# Patient Record
Sex: MALE | Race: WHITE
[De-identification: names, ages, dates, MRNs, and addresses within clinical notes are randomized per-mention and may not be internally consistent; named-entity substitution may affect disease eponyms.]

---

## 2022-03-25 PROBLEM — Z00.00 ENCOUNTER FOR PREVENTIVE HEALTH EXAMINATION: Status: ACTIVE | Noted: 2022-03-25

## 2022-04-21 ENCOUNTER — APPOINTMENT (OUTPATIENT)
Dept: BARIATRICS/WEIGHT MGMT | Facility: CLINIC | Age: 67
End: 2022-04-21

## 2023-01-25 ENCOUNTER — APPOINTMENT (OUTPATIENT)
Dept: UROLOGY | Facility: CLINIC | Age: 68
End: 2023-01-25
Payer: MEDICARE

## 2023-01-25 VITALS — DIASTOLIC BLOOD PRESSURE: 82 MMHG | SYSTOLIC BLOOD PRESSURE: 137 MMHG | HEART RATE: 121 BPM

## 2023-01-25 DIAGNOSIS — Z82.49 FAMILY HISTORY OF ISCHEMIC HEART DISEASE AND OTHER DISEASES OF THE CIRCULATORY SYSTEM: ICD-10-CM

## 2023-01-25 PROCEDURE — 99203 OFFICE O/P NEW LOW 30 MIN: CPT

## 2023-01-25 NOTE — ASSESSMENT
[FreeTextEntry1] : Patient is a 67 year male who presents with penile lesion.  Lesion noted 2 months ago.  \par associated pain with palpation 3/10 .  no associated symptoms.  no modifying factors.\par exam reveals verrucous appearing 2 cm lesion on glans penis\par \par A/P\par 1. penile lesion \par I recommended penile biopsy and cystoscopy under MAC.  The risks and benefits were discussed in detail with patient with the [facility]  and daughter who speaks both languages.  He understood and wishes to proceed with cystoscopy and penile biopsy.

## 2023-01-25 NOTE — PHYSICAL EXAM
[General Appearance - Well Developed] : well developed [Normal Appearance] : normal appearance [General Appearance - In No Acute Distress] : no acute distress [] : no respiratory distress [Respiration, Rhythm And Depth] : normal respiratory rhythm and effort [Abdomen Soft] : soft [Abdomen Hernia] : no hernia was discovered [Urethral Meatus] : meatus normal [Scrotum] : the scrotum was normal [Epididymis] : the epididymides were normal [Testes Tenderness] : no tenderness of the testes [Testes Mass (___cm)] : there were no testicular masses [FreeTextEntry1] : uncircumcised penis 2 cm lesion on glans raised and verrucous in appearance; moderate tenderness to palpation [Normal Station and Gait] : the gait and station were normal for the patient's age [Oriented To Time, Place, And Person] : oriented to person, place, and time [Femoral Lymph Nodes Enlarged Bilaterally] : femoral [Inguinal Lymph Nodes Enlarged Bilaterally] : inguinal

## 2023-02-03 ENCOUNTER — APPOINTMENT (OUTPATIENT)
Dept: UROLOGY | Facility: HOSPITAL | Age: 68
End: 2023-02-03

## 2023-02-03 ENCOUNTER — RESULT REVIEW (OUTPATIENT)
Age: 68
End: 2023-02-03

## 2023-02-03 ENCOUNTER — TRANSCRIPTION ENCOUNTER (OUTPATIENT)
Age: 68
End: 2023-02-03

## 2023-02-08 ENCOUNTER — NON-APPOINTMENT (OUTPATIENT)
Age: 68
End: 2023-02-08

## 2023-02-09 ENCOUNTER — APPOINTMENT (OUTPATIENT)
Dept: UROLOGY | Facility: CLINIC | Age: 68
End: 2023-02-09
Payer: MEDICAID

## 2023-02-09 VITALS — DIASTOLIC BLOOD PRESSURE: 84 MMHG | SYSTOLIC BLOOD PRESSURE: 133 MMHG | HEART RATE: 99 BPM

## 2023-02-09 PROCEDURE — 99024 POSTOP FOLLOW-UP VISIT: CPT

## 2023-02-09 NOTE — PHYSICAL EXAM
[General Appearance - Well Developed] : well developed [Normal Appearance] : normal appearance [General Appearance - In No Acute Distress] : no acute distress [Abdomen Soft] : soft [Costovertebral Angle Tenderness] : no ~M costovertebral angle tenderness [FreeTextEntry1] : biopsy site on glans healing well [Skin Color & Pigmentation] : normal skin color and pigmentation [] : no respiratory distress [Oriented To Time, Place, And Person] : oriented to person, place, and time [Inguinal Lymph Nodes Enlarged Bilaterally] : inguinal

## 2023-02-09 NOTE — ASSESSMENT
[FreeTextEntry1] : Patient is a 67 year male who presented with penile lesion.  He underwent a penile biopsy 1 week ago.  \par no interval pain or symptoms.  \par Pathology came back as invasive squamous cell cancer\par biopsy site is healing well \par no inguinal nodes are palpable \par \par A/P\par 1. penile cancer \par explained diagnosis and next steps with patient and daughter\par will get staging CT Scan and refer to Dr. Garcia\par

## 2023-03-03 ENCOUNTER — APPOINTMENT (OUTPATIENT)
Dept: UROLOGY | Facility: CLINIC | Age: 68
End: 2023-03-03
Payer: MEDICAID

## 2023-03-03 VITALS — DIASTOLIC BLOOD PRESSURE: 74 MMHG | HEART RATE: 99 BPM | SYSTOLIC BLOOD PRESSURE: 131 MMHG

## 2023-03-03 PROCEDURE — 99215 OFFICE O/P EST HI 40 MIN: CPT | Mod: 57

## 2023-03-03 NOTE — ASSESSMENT
[FreeTextEntry1] : 66yo male with invasive SCC of the penis, diagnosed 1 month ago \par On exam, entire glans appears to be involved\par CT imaging reviewed, no obvious adenopathy but radiology read pending\par Reviewed path report, no stage able to be determined by biopsy\par Recommend partial penectomy\par Reviewed procedure with patient and his daughter including risks \par Will likely need subsequent pelvic lymph node dissection \par Medical and cardiac clearance for partial penectomy

## 2023-03-03 NOTE — REVIEW OF SYSTEMS
[SOB on Exertion] : shortness of breath during exertion [Genital Lesion] : genital lesion [Negative] : Psychiatric

## 2023-03-03 NOTE — PHYSICAL EXAM
[General Appearance - In No Acute Distress] : no acute distress [Abdomen Soft] : soft [Abdomen Tenderness] : non-tender [Costovertebral Angle Tenderness] : no ~M costovertebral angle tenderness [FreeTextEntry1] : entire glans appears to be indurated, erythematous, biopsy site noted on distal glans

## 2023-03-03 NOTE — HISTORY OF PRESENT ILLNESS
[FreeTextEntry1] : 68yo male with morbid obesity, BMD 44, tobacco dependency, HTN, DM, HLD, referred for recent diagnosis of invasive SCC of the penis. Underwent biopsy of glans 2/03, path: invasive SCC, extends to inked margin. \par Underwent CT scan yesterday, imaging available but not results.  [None] : None

## 2023-03-17 ENCOUNTER — NON-APPOINTMENT (OUTPATIENT)
Age: 68
End: 2023-03-17

## 2023-03-20 ENCOUNTER — APPOINTMENT (OUTPATIENT)
Dept: CARDIOLOGY | Facility: CLINIC | Age: 68
End: 2023-03-20
Payer: MEDICAID

## 2023-03-20 ENCOUNTER — NON-APPOINTMENT (OUTPATIENT)
Age: 68
End: 2023-03-20

## 2023-03-20 ENCOUNTER — TRANSCRIPTION ENCOUNTER (OUTPATIENT)
Age: 68
End: 2023-03-20

## 2023-03-20 VITALS
TEMPERATURE: 98 F | HEIGHT: 67 IN | OXYGEN SATURATION: 96 % | WEIGHT: 273.59 LBS | DIASTOLIC BLOOD PRESSURE: 68 MMHG | SYSTOLIC BLOOD PRESSURE: 107 MMHG | HEART RATE: 80 BPM | RESPIRATION RATE: 20 BRPM

## 2023-03-20 VITALS
WEIGHT: 272 LBS | RESPIRATION RATE: 14 BRPM | OXYGEN SATURATION: 95 % | BODY MASS INDEX: 42.69 KG/M2 | DIASTOLIC BLOOD PRESSURE: 68 MMHG | HEIGHT: 67 IN | SYSTOLIC BLOOD PRESSURE: 116 MMHG | HEART RATE: 99 BPM

## 2023-03-20 DIAGNOSIS — M17.11 UNILATERAL PRIMARY OSTEOARTHRITIS, RIGHT KNEE: ICD-10-CM

## 2023-03-20 PROCEDURE — 99204 OFFICE O/P NEW MOD 45 MIN: CPT | Mod: 25

## 2023-03-20 PROCEDURE — 93000 ELECTROCARDIOGRAM COMPLETE: CPT | Mod: NC

## 2023-03-20 RX ORDER — ATORVASTATIN CALCIUM 10 MG/1
10 TABLET, FILM COATED ORAL
Refills: 0 | Status: ACTIVE | COMMUNITY

## 2023-03-20 RX ORDER — LISINOPRIL 5 MG/1
5 TABLET ORAL DAILY
Refills: 0 | Status: ACTIVE | COMMUNITY

## 2023-03-20 RX ORDER — METFORMIN HYDROCHLORIDE 1000 MG/1
1000 TABLET, COATED ORAL TWICE DAILY
Refills: 0 | Status: ACTIVE | COMMUNITY

## 2023-03-20 RX ORDER — SEMAGLUTIDE 1.34 MG/ML
INJECTION, SOLUTION SUBCUTANEOUS WEEKLY
Refills: 0 | Status: ACTIVE | COMMUNITY

## 2023-03-20 NOTE — PHYSICAL EXAM
[Well Developed] : well developed [No Acute Distress] : no acute distress [Normal Conjunctiva] : normal conjunctiva [Normal Venous Pressure] : normal venous pressure [No Carotid Bruit] : no carotid bruit [Normal S1, S2] : normal S1, S2 [No Murmur] : no murmur [No Rub] : no rub [No Gallop] : no gallop [Clear Lung Fields] : clear lung fields [Good Air Entry] : good air entry [No Respiratory Distress] : no respiratory distress  [No Edema] : no edema [No Cyanosis] : no cyanosis [No Clubbing] : no clubbing [Moves all extremities] : moves all extremities [No Focal Deficits] : no focal deficits [Normal Speech] : normal speech [Alert and Oriented] : alert and oriented [Normal memory] : normal memory [Obese] : obese

## 2023-03-20 NOTE — ASSESSMENT
[FreeTextEntry1] : 66 yo male smoker (1 ppd, quit several days ago) with DM type 2, hyperlipidemia, and hypertension, who is currently pending partial penectomy (for invasive squamous cell carcinoma) under general anesthesia. Surgery is currently scheduled on 3/21/23 with Dr. Joe Perez (ph: [phone], fax: [phone]). \par CXR and labs (CBC, CMP, HgbA1c, lipid panel, TSH) from 3/16/23 were reviewed today and were unremarkable.\par ECG today demonstrated sinus rhythm, rate 94 bpm, low voltage QRS, and could rule out prior anterior infarct.\par \par Patient with exercise capacity > 4 METs and without exertional complaints to suggest significant ischemic heart disease.\par Geriatric sensitive perioperative cardiac risk index = 0.3% risk of perioperative MI or cardiac arrest\par Patient does not have any cardiac contraindications to pending surgery. Patient may proceed with acceptable cardiac risk and without further cardiac work-up or intervention as this would only delay his needed surgery and not significantly alter his perioperative cardiac management.\par After patient has recovered from his surgery, he should return for further cardiac evaluation.\par \par BP is currently controlled.\par Will continue amlodipine 10 mg po daily and lisinopril 5 mg po daily.\par \par LDL 95 per 3/16/23 labs\par Will continue atorvastatin 10 mg po daily for hyperlipidemia management.

## 2023-03-20 NOTE — CARDIOLOGY SUMMARY
[de-identified] : \par 3/20/23 ECG: Sinus rhythm, rate 94 bpm, low voltage QRS, cannot rule out prior anterior infarct\par 3/16/23 ECG (from primary care physician): Sinus tach, rate 101 bpm, possible anterior infarct\par

## 2023-03-20 NOTE — ASU PATIENT PROFILE, ADULT - NSICDXPASTMEDICALHX_GEN_ALL_CORE_FT
PAST MEDICAL HISTORY:  Arthritis     DM (diabetes mellitus)     Elevated cholesterol     HTN (hypertension)     Malignant neoplasm of penis, unspecified

## 2023-03-20 NOTE — HISTORY OF PRESENT ILLNESS
[Preoperative Visit] : for a medical evaluation prior to surgery [Scheduled Procedure ___] : a [unfilled] [Date of Surgery ___] : on [unfilled] [Surgeon Name ___] : surgeon: [unfilled] [Stable] : Stable [FreeTextEntry1] : \par 68 yo male smoker (1 ppd, quit several days ago) with DM type 2, hyperlipidemia, and hypertension, who presents today for pre-operative cardiac evaluation for pending partial penectomy (for invasive squamous cell carcinoma) under general anesthesia. Surgery is currently scheduled on 3/21/23 with Dr. Joe Perez (ph: [phone], fax: [phone]). Patient denies chest pain, dyspnea, palpitations, syncope, edema, melena, hematochezia, or hematemesis. He does not exercise regularly, but can go up a flight of stairs without complaints. Patient was noted to have abnormal ECG at primary care physician's office, which demonstrated possible prior anterior infarct.\par \par Primary: Ilda Flores ([facility], ph: [phone], fax: [phone])

## 2023-03-20 NOTE — ASU PATIENT PROFILE, ADULT - FALL HARM RISK - HARM RISK INTERVENTIONS

## 2023-03-21 ENCOUNTER — TRANSCRIPTION ENCOUNTER (OUTPATIENT)
Age: 68
End: 2023-03-21

## 2023-03-21 ENCOUNTER — OUTPATIENT (OUTPATIENT)
Dept: OUTPATIENT SERVICES | Facility: HOSPITAL | Age: 68
LOS: 1 days | Discharge: HOME | End: 2023-03-21
Payer: MEDICARE

## 2023-03-21 ENCOUNTER — RESULT REVIEW (OUTPATIENT)
Age: 68
End: 2023-03-21

## 2023-03-21 ENCOUNTER — APPOINTMENT (OUTPATIENT)
Dept: UROLOGY | Facility: HOSPITAL | Age: 68
End: 2023-03-21

## 2023-03-21 VITALS
OXYGEN SATURATION: 96 % | SYSTOLIC BLOOD PRESSURE: 122 MMHG | HEART RATE: 86 BPM | TEMPERATURE: 98 F | RESPIRATION RATE: 18 BRPM | DIASTOLIC BLOOD PRESSURE: 63 MMHG

## 2023-03-21 DIAGNOSIS — Z96.651 PRESENCE OF RIGHT ARTIFICIAL KNEE JOINT: Chronic | ICD-10-CM

## 2023-03-21 LAB
BLD GP AB SCN SERPL QL: NEGATIVE — SIGNIFICANT CHANGE UP
GLUCOSE BLDC GLUCOMTR-MCNC: 108 MG/DL — HIGH (ref 70–99)
RH IG SCN BLD-IMP: POSITIVE — SIGNIFICANT CHANGE UP

## 2023-03-21 PROCEDURE — 54120 PARTIAL REMOVAL OF PENIS: CPT

## 2023-03-21 PROCEDURE — 82962 GLUCOSE BLOOD TEST: CPT

## 2023-03-21 PROCEDURE — 88307 TISSUE EXAM BY PATHOLOGIST: CPT | Mod: 26

## 2023-03-21 PROCEDURE — 86900 BLOOD TYPING SEROLOGIC ABO: CPT

## 2023-03-21 PROCEDURE — 88307 TISSUE EXAM BY PATHOLOGIST: CPT

## 2023-03-21 PROCEDURE — 86850 RBC ANTIBODY SCREEN: CPT

## 2023-03-21 PROCEDURE — 86901 BLOOD TYPING SEROLOGIC RH(D): CPT

## 2023-03-21 RX ORDER — ENOXAPARIN SODIUM 100 MG/ML
40 INJECTION SUBCUTANEOUS ONCE
Refills: 0 | Status: COMPLETED | OUTPATIENT
Start: 2023-03-21 | End: 2023-03-21

## 2023-03-21 RX ORDER — ACETAMINOPHEN 500 MG
1000 TABLET ORAL ONCE
Refills: 0 | Status: COMPLETED | OUTPATIENT
Start: 2023-03-21 | End: 2023-03-21

## 2023-03-21 RX ORDER — ACETAMINOPHEN 500 MG
650 TABLET ORAL ONCE
Refills: 0 | Status: DISCONTINUED | OUTPATIENT
Start: 2023-03-21 | End: 2023-03-21

## 2023-03-21 RX ADMIN — ENOXAPARIN SODIUM 40 MILLIGRAM(S): 100 INJECTION SUBCUTANEOUS at 12:55

## 2023-03-21 RX ADMIN — Medication 1000 MILLIGRAM(S): at 12:55

## 2023-03-21 NOTE — ASU DISCHARGE PLAN (ADULT/PEDIATRIC) - CARE PROVIDER_API CALL
Joe Perez)  Urology  [address]  [place], [place] [address]  Phone: [phone]  Fax: [phone]  Follow Up Time:

## 2023-03-21 NOTE — ASU DISCHARGE PLAN (ADULT/PEDIATRIC) - ASU DC SPECIAL INSTRUCTIONSFT
PARTIAL PENECTOMY    STITCHES: The stitches in the incision will dissolve and fall out by themselves. Sometimes skin stitches may open, allowing a slight gaping of the incision. This is no problem if you keep the area clean. You may apply Bacitracin (available over the counter) or Vaseline to the incision 3 times a day for the first week. Any “black and blue” bruising areas are expected and will also resolve over weeks.  You may apply an ice-pack for 15 minutes out of every hour for the first 24 -36 hours to minimize pain and swelling.    PAIN: You may have some intermittent pain for up to six (6) weeks post operatively. Pain does not signify any problem unless associated with fever, chills, or inability to void.  If you experience any fevers or chills please call immediately as this may be signs of an infection. You may take Tylenol (acetaminophen) 650-975mg and/or Motrin (ibuprofen) 400-600mg, both available over the counter, for pain every 6 hours as needed. Do not exceed 4000mg of Tylenol (acetaminophen) daily. You may alternate these medications such that you take one or the other every 3 hours for around the clock pain coverage.    STOOL SOFTENERS: Do not allow yourself to become constipated as straining may cause bleeding. Take stool softeners or a laxative (ex. Miralax, Colace, Senokot, ExLax, etc), available over the counter, if taking Percocet. For your convenience, all prescriptions are sent to [facility] pharmacy at [address] [place] [address], on the corner of [address] and [address] [place] [address].    ANTICOAGULATION: If you are taking any blood thinning medications, please discuss with your urologist prior to restarting these medications unless otherwise specified. Continue your home Aspirin 81mg daily.    BATHING: You may sponge bath 24 hours after surgery, but minimize water to the surgical incision and drain.    DIET: You may resume your regular diet and regular medication regimen.    ACTIVITY: No heavy lifting or strenuous exercise until you are evaluated at your post-operative appointment. Otherwise, you may return to your usual level of physical activity.    FOLLOW-UP: If you did not already schedule your post-operative appointment, please call your urologist to schedule and follow-up appointment. Follow up with Dr. Perez on Friday in [place] for catheter removal.     CALL YOUR UROLOGIST IF: You have any bleeding that does not stop, inability to void >8 hours, fever over 100.4 F, chills, persistent nausea/vomiting, changes in your incision concerning for infection, or if your pain is not controlled on your discharge pain medications.      CATHETER: Most patients are sent home with a Santa catheter, which continuously drains the urine from the bladder. If you still have a catheter, the nurses will review instructions and care before you go home.

## 2023-03-21 NOTE — PRE-ANESTHESIA EVALUATION ADULT - SPO2 (%)
Patients Dysport arrived in office today  Logged into book and stored in fridge      Thank you,  Nanette Mauricio 89

## 2023-03-21 NOTE — H&P ADULT - HISTORY OF PRESENT ILLNESS
66yo male with morbid obesity, BMD 44, tobacco dependency, HTN, DM, HLD, referred for recent diagnosis of invasive SCC of the penis. Underwent biopsy of glans 2/03, path: invasive SCC, extends to inked margin.   Underwent CT scan yesterday, imaging available but not results.     Pain Level: None      Active Problems  Diabetes mellitus (250.00) (E11.9)  High cholesterol (272.0) (E78.00)  HTN (hypertension) (401.9) (I10)  Penile cancer (187.4) (C60.9)  Penile lesion (607.89) (N48.9)    Past Medical History  History of No significant past medical history    Current Meds  Cephalexin 500 MG Oral Capsule; TAKE 1 CAPSULE EVERY 12 HOURS DAILY  oxyCODONE-Acetaminophen 5-325 MG Oral Tablet; TAKE 1 TABLET EVERY 6 HOURS  AS NEEDED FOR PAIN. MDD:4 tabs  amLODIPine Besylate 10 MG Oral Tablet  Atorvastatin Calcium 10 MG Oral Tablet  Lisinopril 5 MG Oral Tablet  metFORMIN HCl - 1000 MG Oral Tablet  Ozempic (1 MG/DOSE) SOPN    Allergies  Amoxicillin TABS    Review of Systems    Respiratory: shortness of breath during exertion.   Genitourinary: genital lesion.   Constitutional, Cardiovascular, Neurological and Psychiatric are otherwise negative.

## 2023-03-21 NOTE — H&P ADULT - ASSESSMENT
Penile cancer (187.4) (C60.9)  Diabetes mellitus (250.00) (E11.9)  High cholesterol (272.0) (E78.00)  HTN (hypertension) (401.9) (I10)  Morbid obesity with BMI of 40.0-44.9, adult (278.01,V85.41) (E66.01,Z68.41)  Heavy smoker (305.1) (F17.200)    66yo male with invasive SCC of the penis, diagnosed 1 month ago   On exam, entire glans appears to be involved  CT imaging reviewed, no obvious adenopathy but radiology read pending  Reviewed path report, no stage able to be determined by biopsy  Recommend partial penectomy  Reviewed procedure with patient and his daughter including risks   Will likely need subsequent pelvic lymph node dissection   Medical and cardiac clearance for partial penectomy.

## 2023-03-21 NOTE — H&P ADULT - SOCIAL HISTORY
Pt called to provide fax number to Doming[facility] Eugene for her [employer]. Fax [phone].    Thanks    No

## 2023-03-23 PROBLEM — E78.00 PURE HYPERCHOLESTEROLEMIA, UNSPECIFIED: Chronic | Status: ACTIVE | Noted: 2023-03-20

## 2023-03-23 PROBLEM — E11.9 TYPE 2 DIABETES MELLITUS WITHOUT COMPLICATIONS: Chronic | Status: ACTIVE | Noted: 2023-03-20

## 2023-03-23 PROBLEM — M19.90 UNSPECIFIED OSTEOARTHRITIS, UNSPECIFIED SITE: Chronic | Status: ACTIVE | Noted: 2023-03-20

## 2023-03-23 PROBLEM — C60.9 MALIGNANT NEOPLASM OF PENIS, UNSPECIFIED: Chronic | Status: ACTIVE | Noted: 2023-03-20

## 2023-03-23 PROBLEM — I10 ESSENTIAL (PRIMARY) HYPERTENSION: Chronic | Status: ACTIVE | Noted: 2023-03-20

## 2023-03-24 ENCOUNTER — APPOINTMENT (OUTPATIENT)
Dept: UROLOGY | Facility: CLINIC | Age: 68
End: 2023-03-24
Payer: MEDICAID

## 2023-03-24 VITALS — DIASTOLIC BLOOD PRESSURE: 77 MMHG | HEART RATE: 102 BPM | SYSTOLIC BLOOD PRESSURE: 125 MMHG

## 2023-03-24 PROCEDURE — 99024 POSTOP FOLLOW-UP VISIT: CPT

## 2023-03-28 LAB — SURGICAL PATHOLOGY STUDY: SIGNIFICANT CHANGE UP

## 2023-03-31 NOTE — PHYSICAL EXAM
[General Appearance - In No Acute Distress] : no acute distress [Abdomen Soft] : soft [Urethral Meatus] : meatus normal [Penis Abnormality] : normal circumcised penis [FreeTextEntry1] : incision healing well [] : no rash [Respiration, Rhythm And Depth] : normal respiratory rhythm and effort [Oriented To Time, Place, And Person] : oriented to person, place, and time

## 2023-03-31 NOTE — ASSESSMENT
[FreeTextEntry1] : Patient is a 67 year male s/p partial penectomy with Dr. Garcia. He is here for catheter removal.  Pathology not back.  \par no pain. \par \par A/P\par 1. penile cancer \par brown removed\par instructed to call if problems voiding \par he will fu with Dr. Garcia once pathology back\par

## 2023-04-21 ENCOUNTER — APPOINTMENT (OUTPATIENT)
Dept: UROLOGY | Facility: CLINIC | Age: 68
End: 2023-04-21
Payer: MEDICAID

## 2023-04-21 VITALS
DIASTOLIC BLOOD PRESSURE: 69 MMHG | HEART RATE: 91 BPM | SYSTOLIC BLOOD PRESSURE: 118 MMHG | BODY MASS INDEX: 42.69 KG/M2 | WEIGHT: 272 LBS | HEIGHT: 67 IN

## 2023-04-21 PROCEDURE — 99214 OFFICE O/P EST MOD 30 MIN: CPT | Mod: 24

## 2023-04-21 NOTE — HISTORY OF PRESENT ILLNESS
[None] : None [FreeTextEntry1] : 66yo male with morbid obesity, BMD 44, tobacco dependency, HTN, DM, HLD, referred for recent diagnosis of invasive SCC of the penis. Underwent biopsy of glans 2/03, path: invasive SCC, extends to inked margin. \par Underwent CT scan yesterday, imaging available but not results. \par \par 4/21/23 s/p partial penectomy 3/21/23. Doing well, voiding well without issues\par Path: pT3, G2, moderately differentiated SCC infiltrates corpus cavernosum

## 2023-04-21 NOTE — PHYSICAL EXAM
[General Appearance - In No Acute Distress] : no acute distress [Abdomen Soft] : soft [Costovertebral Angle Tenderness] : no ~M costovertebral angle tenderness [Abdomen Tenderness] : non-tender [FreeTextEntry1] : s/p partial penectomy

## 2023-04-21 NOTE — ASSESSMENT
[FreeTextEntry1] : 66yo male with invasive SCC of the penis diagnosed 2/2023\par s/p partial penectomy 3/21/23\par Path reviewed today, T3G2, negative margins\par Discussed management options including observation vs bilateral inguinal lymph node dissection for staging\par CT 3/01/23 negative for adenopathy\par Recommend repeat CT\par He would like to proceed with bilateral inguinal lymph node dissection\par Reviewed risks including wound infection, bleeding, DVT, chronic lymphedema \par Medical and cardiac clearance

## 2023-05-10 ENCOUNTER — APPOINTMENT (OUTPATIENT)
Dept: CARDIOLOGY | Facility: CLINIC | Age: 68
End: 2023-05-10
Payer: MEDICARE

## 2023-05-10 ENCOUNTER — NON-APPOINTMENT (OUTPATIENT)
Age: 68
End: 2023-05-10

## 2023-05-10 VITALS
HEART RATE: 96 BPM | BODY MASS INDEX: 42.69 KG/M2 | WEIGHT: 272 LBS | OXYGEN SATURATION: 96 % | RESPIRATION RATE: 14 BRPM | HEIGHT: 67 IN | SYSTOLIC BLOOD PRESSURE: 123 MMHG | DIASTOLIC BLOOD PRESSURE: 68 MMHG

## 2023-05-10 DIAGNOSIS — Z01.810 ENCOUNTER FOR PREPROCEDURAL CARDIOVASCULAR EXAMINATION: ICD-10-CM

## 2023-05-10 DIAGNOSIS — R94.31 ABNORMAL ELECTROCARDIOGRAM [ECG] [EKG]: ICD-10-CM

## 2023-05-10 PROCEDURE — 93000 ELECTROCARDIOGRAM COMPLETE: CPT | Mod: NC

## 2023-05-10 PROCEDURE — 99214 OFFICE O/P EST MOD 30 MIN: CPT | Mod: 25

## 2023-05-10 NOTE — HISTORY OF PRESENT ILLNESS
[Preoperative Visit] : for a medical evaluation prior to surgery [Scheduled Procedure ___] : a [unfilled] [Date of Surgery ___] : on [unfilled] [Surgeon Name ___] : surgeon: [unfilled] [Stable] : Stable [FreeTextEntry1] : \par 68 yo male smoker (1 ppd, quit several days ago) with DM type 2, hyperlipidemia, hypertension, abnormal ECG, and invasive squamous cell carcinoma s/p partial penectomy on 3/21/23. Patient presents today for cardiology follow-up and also pre-operative cardiac evaluation for pending bilateral inguinal lymph node dissection on 6/1/23 with Dr. Joe Perez (ph: [phone], fax: [phone]). Patient denies chest pain, dyspnea, palpitations, syncope, edema, melena, hematochezia, or hematemesis. He does not exercise regularly, but can go up a flight of stairs without complaints.\par \par Primary: Ilda Flores ([facility], ph: [phone], fax: [phone])

## 2023-05-10 NOTE — CARDIOLOGY SUMMARY
[de-identified] : \par 5/10/23 ECG: Sinus rhythm, rate 91 bpm, low voltage QRS, cannot rule out old anterior infarct\par 3/20/23 ECG: Sinus rhythm, rate 94 bpm, low voltage QRS, cannot rule out old anterior infarct\par 3/16/23 ECG (from primary care physician): Sinus tach, rate 101 bpm, possible anterior infarct\par

## 2023-05-10 NOTE — ASSESSMENT
[FreeTextEntry1] : 66 yo male smoker (1 ppd, quit several days ago) with DM type 2, hyperlipidemia, hypertension, abnormal ECG, and invasive squamous cell carcinoma s/p partial penectomy on 3/21/23. Patient is now pending bilateral inguinal lymph node dissection on 6/1/23 with Dr. Joe Perez (ph: [phone], fax: [phone]).\par ECG today demonstrated sinus rhythm, low voltage QRS, cannot rule out prior anterior infarct.\par \par Patient with exercise capacity > 4 METs and without exertional complaints to suggest significant ischemic heart disease.\par Geriatric sensitive perioperative cardiac risk index = 0.3% risk of perioperative MI or cardiac arrest\par Patient does not have any cardiac contraindications to pending surgery. Patient may proceed with acceptable cardiac risk and without further cardiac work-up or intervention as this would only delay his needed surgery and not significantly alter his perioperative cardiac management.\par \par Will order echocardiogram to assess LV function and structural heart disease given ECG abnormalities and hypertension. However, this test does not need to be performed before his pending surgery.\par \par BP is currently controlled.\par Will continue amlodipine 10 mg po daily and lisinopril 5 mg po daily.\par \par LDL 95 per 3/16/23 labs\par Will continue atorvastatin 10 mg po daily for hyperlipidemia management.

## 2023-05-22 ENCOUNTER — RESULT REVIEW (OUTPATIENT)
Age: 68
End: 2023-05-22

## 2023-05-23 ENCOUNTER — NON-APPOINTMENT (OUTPATIENT)
Age: 68
End: 2023-05-23

## 2023-05-23 ENCOUNTER — APPOINTMENT (OUTPATIENT)
Dept: THORACIC SURGERY | Facility: CLINIC | Age: 68
End: 2023-05-23
Payer: MEDICAID

## 2023-05-23 VITALS — BODY MASS INDEX: 43.95 KG/M2 | WEIGHT: 280 LBS | HEIGHT: 67 IN

## 2023-05-23 PROCEDURE — G0296 VISIT TO DETERM LDCT ELIG: CPT

## 2023-05-25 NOTE — REASON FOR VISIT
[Home] : at home, [unfilled] , at the time of the visit. [Other Location: e.g. Home (Enter Location, City,State)___] : at [unfilled] [Family Member] : family member [Verbal consent obtained from patient] : the patient, [unfilled] [Initial Evaluation] : an initial evaluation visit [Review of Eligibility] : review of eligibility [Low-Dose CT Screening Discussion] : low-dose CT lung cancer screening discussion [Interpreters_IDNumber] : [id number]

## 2023-05-25 NOTE — ASSESSMENT
[Discussed Risks and Advised to Quit Smoking] : Discussed risks and advised to quit smoking [Ready] : Patient is ready for cessation intervention [Contemplation] : Contemplation: The patient is considering quitting smoking [de-identified] : Acknowledged how  difficult it is to quit smoking. Advised that quitting smoking is the most important thing a person can do for their health. Due to time constraints ( pt was on his way to another provider appointment) pt was advised to quit and he reports he will try. Discussion with daughter and wife. At any point they agree to call writer to further discuss smoking cessation intervention\par

## 2023-05-25 NOTE — PLAN
[Smoking Cessation] : smoking cessation [FreeTextEntry1] : Plan:\par \par -Low dose CT chest for lung cancer screening. GUSTAVO BETANCOURT ordered the low dose CT.      \par \par -Follow up with patient and his referring provider after his LDCT results have been reviewed by the multidisciplinary clinical team, if needed.\par \par -Encouraged smoking cessation.\par \par Patient declines referral to CTC and CCX\par \par Should I screen? tool utilized. 6 Year risk of lung cancer is 2.7  %. \par \par Patient wishes to proceed with screening.\par \par Engaged in discussion regarding risks of screening during Covid-19 pandemic and precautions that are being used  to reduce exposure.\par \par Engaged in shared decision making with Mr. CLEVELAND . Answered all questions. He verbalized understanding and agreement. He knows to call back with and questions or concerns.\par

## 2023-05-25 NOTE — HISTORY OF PRESENT ILLNESS
[Current] : Current [TextBox_13] : Referred by Dr. Flores\par \par JOAN CLEVELAND had telephonic visit for a review of eligibility and discussion of the Low dose CT lung cancer screening program. The following was reviewed and confirmed the patient meets screening eligibility criteria.\par -Age 67 year\par Smoking Status:\par -Current smoker\par Started smoking at 35 years old.  Smokes 1 PPD for 32 years.\par -Number of pack years smokin\par \par \par Mr. CLEVELAND denies any signs or symptoms of lung cancer including new cough, changing cough, hemoptysis, and unintentional weight loss. \par \par Mr. CLEVELAND reports penile cancer with partial penectomy 3/2023. Has pending lymph node dissection planned for 2023.  Denies any history of lung disease.     He  denies any personal history of any type of cancer. Reports no lung cancer in a 1st degree relative. Reports no lung cancer in a 2nd degree relative. Denies any  history of  occupational exposures. Has exposure to 2nd hand smoke.\par  [PacksperYear] : 32

## 2023-05-31 ENCOUNTER — TRANSCRIPTION ENCOUNTER (OUTPATIENT)
Age: 68
End: 2023-05-31

## 2023-05-31 VITALS
OXYGEN SATURATION: 96 % | HEIGHT: 67 IN | DIASTOLIC BLOOD PRESSURE: 83 MMHG | HEART RATE: 90 BPM | RESPIRATION RATE: 90 BRPM | TEMPERATURE: 97 F | WEIGHT: 264.55 LBS | SYSTOLIC BLOOD PRESSURE: 132 MMHG

## 2023-05-31 RX ORDER — ASPIRIN/CALCIUM CARB/MAGNESIUM 324 MG
1 TABLET ORAL
Qty: 0 | Refills: 0 | DISCHARGE

## 2023-05-31 RX ORDER — METFORMIN HYDROCHLORIDE 850 MG/1
0 TABLET ORAL
Qty: 0 | Refills: 0 | DISCHARGE

## 2023-05-31 RX ORDER — SEMAGLUTIDE 0.68 MG/ML
0 INJECTION, SOLUTION SUBCUTANEOUS
Qty: 0 | Refills: 0 | DISCHARGE

## 2023-05-31 NOTE — PATIENT PROFILE ADULT - FALL HARM RISK - HARM RISK INTERVENTIONS

## 2023-06-01 ENCOUNTER — INPATIENT (INPATIENT)
Facility: HOSPITAL | Age: 68
LOS: 0 days | Discharge: HOME | DRG: 707 | End: 2023-06-02
Attending: UROLOGY | Admitting: UROLOGY
Payer: MEDICARE

## 2023-06-01 ENCOUNTER — RESULT REVIEW (OUTPATIENT)
Age: 68
End: 2023-06-01

## 2023-06-01 ENCOUNTER — TRANSCRIPTION ENCOUNTER (OUTPATIENT)
Age: 68
End: 2023-06-01

## 2023-06-01 ENCOUNTER — APPOINTMENT (OUTPATIENT)
Dept: UROLOGY | Facility: HOSPITAL | Age: 68
End: 2023-06-01

## 2023-06-01 DIAGNOSIS — Z96.651 PRESENCE OF RIGHT ARTIFICIAL KNEE JOINT: Chronic | ICD-10-CM

## 2023-06-01 DIAGNOSIS — Z90.79 ACQUIRED ABSENCE OF OTHER GENITAL ORGAN(S): Chronic | ICD-10-CM

## 2023-06-01 DIAGNOSIS — C60.9 MALIGNANT NEOPLASM OF PENIS, UNSPECIFIED: ICD-10-CM

## 2023-06-01 LAB
GLUCOSE BLDC GLUCOMTR-MCNC: 111 MG/DL — HIGH (ref 70–99)
GLUCOSE BLDC GLUCOMTR-MCNC: 111 MG/DL — HIGH (ref 70–99)
GLUCOSE BLDC GLUCOMTR-MCNC: 123 MG/DL — HIGH (ref 70–99)
GLUCOSE BLDC GLUCOMTR-MCNC: 163 MG/DL — HIGH (ref 70–99)
GLUCOSE SERPL-MCNC: 120 MG/DL — HIGH (ref 70–99)

## 2023-06-01 PROCEDURE — 88305 TISSUE EXAM BY PATHOLOGIST: CPT | Mod: 26

## 2023-06-01 PROCEDURE — 38760 REMOVE GROIN LYMPH NODES: CPT | Mod: 50,58

## 2023-06-01 DEVICE — CLIP APPLIER ETHICON LIGACLIP 13" LARGE: Type: IMPLANTABLE DEVICE | Site: BILATERAL INGUINAL | Status: FUNCTIONAL

## 2023-06-01 RX ORDER — OXYCODONE HYDROCHLORIDE 5 MG/1
5 TABLET ORAL EVERY 6 HOURS
Refills: 0 | Status: DISCONTINUED | OUTPATIENT
Start: 2023-06-01 | End: 2023-06-02

## 2023-06-01 RX ORDER — ENOXAPARIN SODIUM 100 MG/ML
40 INJECTION SUBCUTANEOUS EVERY 12 HOURS
Refills: 0 | Status: DISCONTINUED | OUTPATIENT
Start: 2023-06-02 | End: 2023-06-02

## 2023-06-01 RX ORDER — SENNA PLUS 8.6 MG/1
2 TABLET ORAL AT BEDTIME
Refills: 0 | Status: DISCONTINUED | OUTPATIENT
Start: 2023-06-01 | End: 2023-06-02

## 2023-06-01 RX ORDER — AMLODIPINE BESYLATE 2.5 MG/1
1 TABLET ORAL
Refills: 0 | DISCHARGE

## 2023-06-01 RX ORDER — DEXTROSE 50 % IN WATER 50 %
25 SYRINGE (ML) INTRAVENOUS ONCE
Refills: 0 | Status: DISCONTINUED | OUTPATIENT
Start: 2023-06-01 | End: 2023-06-02

## 2023-06-01 RX ORDER — AMLODIPINE BESYLATE 2.5 MG/1
10 TABLET ORAL DAILY
Refills: 0 | Status: DISCONTINUED | OUTPATIENT
Start: 2023-06-01 | End: 2023-06-02

## 2023-06-01 RX ORDER — SODIUM CHLORIDE 9 MG/ML
1000 INJECTION, SOLUTION INTRAVENOUS
Refills: 0 | Status: DISCONTINUED | OUTPATIENT
Start: 2023-06-01 | End: 2023-06-02

## 2023-06-01 RX ORDER — ONDANSETRON 8 MG/1
4 TABLET, FILM COATED ORAL EVERY 6 HOURS
Refills: 0 | Status: DISCONTINUED | OUTPATIENT
Start: 2023-06-01 | End: 2023-06-02

## 2023-06-01 RX ORDER — CEFAZOLIN SODIUM 1 G
1000 VIAL (EA) INJECTION EVERY 8 HOURS
Refills: 0 | Status: COMPLETED | OUTPATIENT
Start: 2023-06-01 | End: 2023-06-02

## 2023-06-01 RX ORDER — ATORVASTATIN CALCIUM 80 MG/1
10 TABLET, FILM COATED ORAL AT BEDTIME
Refills: 0 | Status: DISCONTINUED | OUTPATIENT
Start: 2023-06-01 | End: 2023-06-02

## 2023-06-01 RX ORDER — LIDOCAINE 4 G/100G
2 CREAM TOPICAL DAILY
Refills: 0 | Status: DISCONTINUED | OUTPATIENT
Start: 2023-06-01 | End: 2023-06-02

## 2023-06-01 RX ORDER — KETOROLAC TROMETHAMINE 30 MG/ML
15 SYRINGE (ML) INJECTION EVERY 8 HOURS
Refills: 0 | Status: DISCONTINUED | OUTPATIENT
Start: 2023-06-01 | End: 2023-06-02

## 2023-06-01 RX ORDER — ACETAMINOPHEN 500 MG
650 TABLET ORAL EVERY 6 HOURS
Refills: 0 | Status: DISCONTINUED | OUTPATIENT
Start: 2023-06-01 | End: 2023-06-02

## 2023-06-01 RX ORDER — ASPIRIN/CALCIUM CARB/MAGNESIUM 324 MG
81 TABLET ORAL DAILY
Refills: 0 | Status: DISCONTINUED | OUTPATIENT
Start: 2023-06-01 | End: 2023-06-02

## 2023-06-01 RX ORDER — GLUCAGON INJECTION, SOLUTION 0.5 MG/.1ML
1 INJECTION, SOLUTION SUBCUTANEOUS ONCE
Refills: 0 | Status: DISCONTINUED | OUTPATIENT
Start: 2023-06-01 | End: 2023-06-02

## 2023-06-01 RX ORDER — AMLODIPINE BESYLATE 2.5 MG/1
1 TABLET ORAL
Qty: 0 | Refills: 0 | DISCHARGE

## 2023-06-01 RX ORDER — LISINOPRIL 2.5 MG/1
1 TABLET ORAL
Qty: 0 | Refills: 0 | DISCHARGE

## 2023-06-01 RX ORDER — LISINOPRIL 2.5 MG/1
5 TABLET ORAL DAILY
Refills: 0 | Status: DISCONTINUED | OUTPATIENT
Start: 2023-06-01 | End: 2023-06-02

## 2023-06-01 RX ORDER — INSULIN LISPRO 100/ML
VIAL (ML) SUBCUTANEOUS
Refills: 0 | Status: DISCONTINUED | OUTPATIENT
Start: 2023-06-01 | End: 2023-06-02

## 2023-06-01 RX ORDER — ACETAMINOPHEN 500 MG
1000 TABLET ORAL ONCE
Refills: 0 | Status: COMPLETED | OUTPATIENT
Start: 2023-06-01 | End: 2023-06-01

## 2023-06-01 RX ORDER — ENOXAPARIN SODIUM 100 MG/ML
40 INJECTION SUBCUTANEOUS ONCE
Refills: 0 | Status: COMPLETED | OUTPATIENT
Start: 2023-06-01 | End: 2023-06-01

## 2023-06-01 RX ORDER — DEXTROSE 50 % IN WATER 50 %
15 SYRINGE (ML) INTRAVENOUS ONCE
Refills: 0 | Status: DISCONTINUED | OUTPATIENT
Start: 2023-06-01 | End: 2023-06-02

## 2023-06-01 RX ORDER — ATORVASTATIN CALCIUM 80 MG/1
1 TABLET, FILM COATED ORAL
Qty: 0 | Refills: 0 | DISCHARGE

## 2023-06-01 RX ORDER — METFORMIN HYDROCHLORIDE 850 MG/1
1 TABLET ORAL
Qty: 0 | Refills: 0 | DISCHARGE

## 2023-06-01 RX ORDER — DEXTROSE 50 % IN WATER 50 %
12.5 SYRINGE (ML) INTRAVENOUS ONCE
Refills: 0 | Status: DISCONTINUED | OUTPATIENT
Start: 2023-06-01 | End: 2023-06-02

## 2023-06-01 RX ADMIN — Medication 5 MILLIGRAM(S): at 17:01

## 2023-06-01 RX ADMIN — Medication 81 MILLIGRAM(S): at 12:05

## 2023-06-01 RX ADMIN — ENOXAPARIN SODIUM 40 MILLIGRAM(S): 100 INJECTION SUBCUTANEOUS at 07:38

## 2023-06-01 RX ADMIN — Medication 650 MILLIGRAM(S): at 12:06

## 2023-06-01 RX ADMIN — Medication 1000 MILLIGRAM(S): at 07:38

## 2023-06-01 RX ADMIN — SENNA PLUS 2 TABLET(S): 8.6 TABLET ORAL at 21:47

## 2023-06-01 RX ADMIN — Medication 650 MILLIGRAM(S): at 17:02

## 2023-06-01 RX ADMIN — Medication 100 MILLIGRAM(S): at 17:01

## 2023-06-01 RX ADMIN — Medication 2: at 17:21

## 2023-06-01 RX ADMIN — LIDOCAINE 2 PATCH: 4 CREAM TOPICAL at 12:06

## 2023-06-01 RX ADMIN — Medication 15 MILLIGRAM(S): at 17:02

## 2023-06-01 RX ADMIN — Medication 650 MILLIGRAM(S): at 12:43

## 2023-06-01 RX ADMIN — LIDOCAINE 2 PATCH: 4 CREAM TOPICAL at 18:34

## 2023-06-01 RX ADMIN — ATORVASTATIN CALCIUM 10 MILLIGRAM(S): 80 TABLET, FILM COATED ORAL at 21:47

## 2023-06-01 NOTE — PRE-ANESTHESIA EVALUATION ADULT - NSRADCARDRESULTSFT_GEN_ALL_CORE
Outside chart/data reviewed prior to procedure/surgery Outside chart/data reviewed prior to procedure/surgery    Cardiology clearance note in SCM

## 2023-06-01 NOTE — BRIEF OPERATIVE NOTE - NSICDXBRIEFPROCEDURE_GEN_ALL_CORE_FT
PROCEDURES:  Bilateral inguinal lymph node dissection with biopsy 01-Jun-2023 10:48:41  Rajesh Martinez

## 2023-06-01 NOTE — PROGRESS NOTE ADULT - PROBLEM SELECTOR PLAN 1
- s/p Bilateral superficial inguinal lymphadenectomy  - continue drains and monitor output   - due to void. bladder scan after voiding   - abx: Ancef   - diet: DM  - OOB/IS   - SCDs

## 2023-06-02 ENCOUNTER — NON-APPOINTMENT (OUTPATIENT)
Age: 68
End: 2023-06-02

## 2023-06-02 ENCOUNTER — TRANSCRIPTION ENCOUNTER (OUTPATIENT)
Age: 68
End: 2023-06-02

## 2023-06-02 VITALS
DIASTOLIC BLOOD PRESSURE: 87 MMHG | RESPIRATION RATE: 17 BRPM | OXYGEN SATURATION: 93 % | SYSTOLIC BLOOD PRESSURE: 141 MMHG | HEART RATE: 97 BPM | TEMPERATURE: 98 F

## 2023-06-02 LAB
A1C WITH ESTIMATED AVERAGE GLUCOSE RESULT: 6 % — HIGH (ref 4–5.6)
ANION GAP SERPL CALC-SCNC: 10 MMOL/L — SIGNIFICANT CHANGE UP (ref 5–17)
BUN SERPL-MCNC: 14 MG/DL — SIGNIFICANT CHANGE UP (ref 7–23)
CALCIUM SERPL-MCNC: 8.7 MG/DL — SIGNIFICANT CHANGE UP (ref 8.4–10.5)
CHLORIDE SERPL-SCNC: 101 MMOL/L — SIGNIFICANT CHANGE UP (ref 96–108)
CO2 SERPL-SCNC: 22 MMOL/L — SIGNIFICANT CHANGE UP (ref 22–31)
CREAT SERPL-MCNC: 0.75 MG/DL — SIGNIFICANT CHANGE UP (ref 0.5–1.3)
EGFR: 99 ML/MIN/1.73M2 — SIGNIFICANT CHANGE UP
ESTIMATED AVERAGE GLUCOSE: 126 MG/DL — HIGH (ref 68–114)
GLUCOSE BLDC GLUCOMTR-MCNC: 126 MG/DL — HIGH (ref 70–99)
GLUCOSE SERPL-MCNC: 120 MG/DL — HIGH (ref 70–99)
HCT VFR BLD CALC: 44.2 % — SIGNIFICANT CHANGE UP (ref 39–50)
HGB BLD-MCNC: 14.5 G/DL — SIGNIFICANT CHANGE UP (ref 13–17)
MAGNESIUM SERPL-MCNC: 1.9 MG/DL — SIGNIFICANT CHANGE UP (ref 1.6–2.6)
MCHC RBC-ENTMCNC: 28.5 PG — SIGNIFICANT CHANGE UP (ref 27–34)
MCHC RBC-ENTMCNC: 32.8 GM/DL — SIGNIFICANT CHANGE UP (ref 32–36)
MCV RBC AUTO: 86.8 FL — SIGNIFICANT CHANGE UP (ref 80–100)
NRBC # BLD: 0 /100 WBCS — SIGNIFICANT CHANGE UP (ref 0–0)
PHOSPHATE SERPL-MCNC: 3.8 MG/DL — SIGNIFICANT CHANGE UP (ref 2.5–4.5)
PLATELET # BLD AUTO: 271 K/UL — SIGNIFICANT CHANGE UP (ref 150–400)
POTASSIUM SERPL-MCNC: 4.2 MMOL/L — SIGNIFICANT CHANGE UP (ref 3.5–5.3)
POTASSIUM SERPL-SCNC: 4.2 MMOL/L — SIGNIFICANT CHANGE UP (ref 3.5–5.3)
RBC # BLD: 5.09 M/UL — SIGNIFICANT CHANGE UP (ref 4.2–5.8)
RBC # FLD: 13 % — SIGNIFICANT CHANGE UP (ref 10.3–14.5)
SODIUM SERPL-SCNC: 133 MMOL/L — LOW (ref 135–145)
WBC # BLD: 12.97 K/UL — HIGH (ref 3.8–10.5)
WBC # FLD AUTO: 12.97 K/UL — HIGH (ref 3.8–10.5)

## 2023-06-02 PROCEDURE — C1889: CPT

## 2023-06-02 PROCEDURE — 36415 COLL VENOUS BLD VENIPUNCTURE: CPT

## 2023-06-02 PROCEDURE — 82947 ASSAY GLUCOSE BLOOD QUANT: CPT

## 2023-06-02 PROCEDURE — 82962 GLUCOSE BLOOD TEST: CPT

## 2023-06-02 PROCEDURE — 88305 TISSUE EXAM BY PATHOLOGIST: CPT

## 2023-06-02 PROCEDURE — 83036 HEMOGLOBIN GLYCOSYLATED A1C: CPT

## 2023-06-02 PROCEDURE — 85027 COMPLETE CBC AUTOMATED: CPT

## 2023-06-02 PROCEDURE — 80048 BASIC METABOLIC PNL TOTAL CA: CPT

## 2023-06-02 PROCEDURE — 83735 ASSAY OF MAGNESIUM: CPT

## 2023-06-02 PROCEDURE — 84100 ASSAY OF PHOSPHORUS: CPT

## 2023-06-02 RX ORDER — CEPHALEXIN 500 MG
1 CAPSULE ORAL
Qty: 14 | Refills: 0
Start: 2023-06-02 | End: 2023-06-08

## 2023-06-02 RX ADMIN — Medication 15 MILLIGRAM(S): at 00:40

## 2023-06-02 RX ADMIN — Medication 650 MILLIGRAM(S): at 00:41

## 2023-06-02 RX ADMIN — LIDOCAINE 2 PATCH: 4 CREAM TOPICAL at 00:17

## 2023-06-02 RX ADMIN — Medication 100 MILLIGRAM(S): at 00:33

## 2023-06-02 RX ADMIN — Medication 100 MILLIGRAM(S): at 07:01

## 2023-06-02 RX ADMIN — Medication 650 MILLIGRAM(S): at 05:42

## 2023-06-02 RX ADMIN — Medication 650 MILLIGRAM(S): at 01:41

## 2023-06-02 RX ADMIN — Medication 650 MILLIGRAM(S): at 07:29

## 2023-06-02 RX ADMIN — Medication 15 MILLIGRAM(S): at 00:55

## 2023-06-02 NOTE — DISCHARGE NOTE PROVIDER - NSDCCPCAREPLAN_GEN_ALL_CORE_FT
PRINCIPAL DISCHARGE DIAGNOSIS  Diagnosis: Malignant neoplasm of penis, unspecified  Assessment and Plan of Treatment:       SECONDARY DISCHARGE DIAGNOSES  Diagnosis: Diabetes  Assessment and Plan of Treatment:     Diagnosis: Hyperlipemia  Assessment and Plan of Treatment:     Diagnosis: Hypertension  Assessment and Plan of Treatment:

## 2023-06-02 NOTE — DISCHARGE NOTE PROVIDER - CARE PROVIDER_API CALL
Joe Perez  Urology  [address]  [place] [address]  Phone: [phone]  Fax: [phone]  Follow Up Time:

## 2023-06-02 NOTE — DISCHARGE NOTE NURSING/CASE MANAGEMENT/SOCIAL WORK - NSDCPEWEB_GEN_ALL_CORE
[facility] for Tobacco Control website --- [email or web address] --- [email or web address]

## 2023-06-02 NOTE — PROGRESS NOTE ADULT - SUBJECTIVE AND OBJECTIVE BOX
POST OP NOTE:  procedure: Bilateral superficial inguinal lymphadenectomy  Patient denies any acute complaints. Denies chest pain, SOB, fevers, chills, nausea or vomiting.       06-01-23 @ 07:01  -  06-01-23 @ 13:15  --------------------------------------------------------  IN: 0 mL / OUT: 50 mL / NET: -50 mL      T(C): 36.3 (06-01-23 @ 11:56), Max: 36.4 (06-01-23 @ 10:56)  HR: 85 (06-01-23 @ 12:56) (84 - 90)  BP: 119/67 (06-01-23 @ 12:56) (109/62 - 132/83)  RR: 22 (06-01-23 @ 12:56) (16 - 90)  SpO2: 96% (06-01-23 @ 12:56) (92% - 96%)    GEN: NAD  ABD: Soft, ND, NT, left and right inguinal drains with serous output   : Due to void      
INTERVAL HPI/OVERNIGHT EVENTS:  No acute events overnight.    VITALS:    T(F): 98.6 (06-02-23 @ 05:03), Max: 98.8 (06-01-23 @ 20:50)  HR: 97 (06-02-23 @ 05:03) (84 - 99)  BP: 120/74 (06-02-23 @ 05:03) (109/62 - 132/83)  RR: 17 (06-02-23 @ 05:03) (16 - 90)  SpO2: 94% (06-02-23 @ 05:03) (92% - 96%)  Wt(kg): --    I&O's Detail    01 Jun 2023 07:01  -  02 Jun 2023 06:04  --------------------------------------------------------  IN:    Oral Fluid: 480 mL  Total IN: 480 mL    OUT:    Bulb (mL): 105 mL    Bulb (mL): 170 mL    Voided (mL): 500 mL  Total OUT: 775 mL    Total NET: -295 mL          MEDICATIONS:    ANTIBIOTICS:  ceFAZolin   IVPB 1000 milliGRAM(s) IV Intermittent every 8 hours      PAIN CONTROL:  acetaminophen     Tablet .. 650 milliGRAM(s) Oral every 6 hours  ketorolac   Injectable 15 milliGRAM(s) IV Push every 8 hours  ondansetron Injectable 4 milliGRAM(s) IV Push every 6 hours PRN  oxyCODONE    IR 5 milliGRAM(s) Oral every 6 hours PRN       MEDS:      HEME/ONC  aspirin enteric coated 81 milliGRAM(s) Oral daily  enoxaparin Injectable 40 milliGRAM(s) SubCutaneous every 24 hours        PHYSICAL EXAM:  General: No acute distress.  Alert and Oriented  Abdominal Exam: soft, NT, ND   Exam: bilat ISABELLA drains intact- serosanguinous outputs, dressings clean and dry      LABS:    06-01    x   |  x   |  x   ----------------------------<  120<H>  x    |  x   |  x               RADIOLOGY & ADDITIONAL TESTS:

## 2023-06-02 NOTE — DISCHARGE NOTE PROVIDER - NSDCFUADDINST_GEN_ALL_CORE_FT
Make sure your incision sites are clean and dry after showering, it is not necessary to scrub, just let water and soap wash over incision sites.     Advance diet as tolerated, activity as tolerated. No heavy lifting or straining. Stay well hydrated. If fever >100.4 or any change or worsening of symptoms please call doctor or report to ED. Make follow up appointment with Dr Perez in 2 weeks.

## 2023-06-02 NOTE — DISCHARGE NOTE NURSING/CASE MANAGEMENT/SOCIAL WORK - PATIENT PORTAL LINK FT
You can access the FollowMyHealth Patient Portal offered by [facility] by registering at the following website: [email or web address]. By joining [employer]’s FollowMyHealth portal, you will also be able to view your health information using other applications (apps) compatible with our system.

## 2023-06-02 NOTE — DISCHARGE NOTE PROVIDER - HOSPITAL COURSE
68yo male with PMH of DM, HTN, HLD, penile cancer s/p bilateral lymph node dissection 6/1. Pt is hemodynamically stable and optimized for discharge.

## 2023-06-02 NOTE — DISCHARGE NOTE PROVIDER - NSDCMRMEDTOKEN_GEN_ALL_CORE_FT
amLODIPine 10 mg oral tablet: 1 orally once a day  aspirin 81 mg oral tablet: 1 tab(s) orally once a day  atorvastatin 10 mg oral tablet: 1 tab(s) orally once a day  cephalexin 500 mg oral tablet: 1 tab(s) orally 2 times a day Please take one tab twice a day for 7 days  lisinopril 5 mg oral tablet: 1 tab(s) orally once a day  metFORMIN 1000 mg oral tablet: 1 tab(s) orally 2 times a day  Ozempic (1 mg dose) 4 mg/3 mL subcutaneous solution: subcutaneous once a week

## 2023-06-02 NOTE — DISCHARGE NOTE NURSING/CASE MANAGEMENT/SOCIAL WORK - NSDCPEEMAIL_GEN_ALL_CORE
[facility] email [email or web address]

## 2023-06-02 NOTE — PROGRESS NOTE ADULT - PROBLEM SELECTOR PLAN 1
-stable  -OOB  -SCD's, IS  -f/u labs  -for MRI abdomen  -continue present care -stable  -OOB  -SCD's, IS  -f/u labs  -d/c planning  -continue present care

## 2023-06-07 LAB — SURGICAL PATHOLOGY STUDY: SIGNIFICANT CHANGE UP

## 2023-06-08 ENCOUNTER — APPOINTMENT (OUTPATIENT)
Dept: UROLOGY | Facility: CLINIC | Age: 68
End: 2023-06-08
Payer: MEDICAID

## 2023-06-08 VITALS
HEIGHT: 67.32 IN | BODY MASS INDEX: 41.38 KG/M2 | WEIGHT: 266.76 LBS | DIASTOLIC BLOOD PRESSURE: 79 MMHG | HEART RATE: 111 BPM | SYSTOLIC BLOOD PRESSURE: 131 MMHG

## 2023-06-08 DIAGNOSIS — E11.9 TYPE 2 DIABETES MELLITUS WITHOUT COMPLICATIONS: ICD-10-CM

## 2023-06-08 DIAGNOSIS — C60.1 MALIGNANT NEOPLASM OF GLANS PENIS: ICD-10-CM

## 2023-06-08 DIAGNOSIS — Z79.899 OTHER LONG TERM (CURRENT) DRUG THERAPY: ICD-10-CM

## 2023-06-08 DIAGNOSIS — Z79.84 LONG TERM (CURRENT) USE OF ORAL HYPOGLYCEMIC DRUGS: ICD-10-CM

## 2023-06-08 DIAGNOSIS — Z79.85 LONG-TERM (CURRENT) USE OF INJECTABLE NON-INSULIN ANTIDIABETIC DRUGS: ICD-10-CM

## 2023-06-08 DIAGNOSIS — F17.210 NICOTINE DEPENDENCE, CIGARETTES, UNCOMPLICATED: ICD-10-CM

## 2023-06-08 DIAGNOSIS — I10 ESSENTIAL (PRIMARY) HYPERTENSION: ICD-10-CM

## 2023-06-08 DIAGNOSIS — Z88.0 ALLERGY STATUS TO PENICILLIN: ICD-10-CM

## 2023-06-08 DIAGNOSIS — E78.00 PURE HYPERCHOLESTEROLEMIA, UNSPECIFIED: ICD-10-CM

## 2023-06-08 DIAGNOSIS — Z90.79 ACQUIRED ABSENCE OF OTHER GENITAL ORGAN(S): ICD-10-CM

## 2023-06-08 DIAGNOSIS — E66.01 MORBID (SEVERE) OBESITY DUE TO EXCESS CALORIES: ICD-10-CM

## 2023-06-08 DIAGNOSIS — Z79.2 LONG TERM (CURRENT) USE OF ANTIBIOTICS: ICD-10-CM

## 2023-06-08 PROCEDURE — 99024 POSTOP FOLLOW-UP VISIT: CPT

## 2023-06-12 ENCOUNTER — APPOINTMENT (OUTPATIENT)
Dept: UROLOGY | Facility: CLINIC | Age: 68
End: 2023-06-12

## 2023-06-16 ENCOUNTER — APPOINTMENT (OUTPATIENT)
Dept: UROLOGY | Facility: CLINIC | Age: 68
End: 2023-06-16
Payer: MEDICARE

## 2023-06-16 VITALS — DIASTOLIC BLOOD PRESSURE: 78 MMHG | HEART RATE: 102 BPM | SYSTOLIC BLOOD PRESSURE: 123 MMHG

## 2023-06-16 DIAGNOSIS — E66.01 MORBID (SEVERE) OBESITY DUE TO EXCESS CALORIES: ICD-10-CM

## 2023-06-16 PROCEDURE — 99024 POSTOP FOLLOW-UP VISIT: CPT

## 2023-06-16 RX ORDER — NICOTINE 21 MG/24HR
21 PATCH, TRANSDERMAL 24 HOURS TRANSDERMAL
Refills: 0 | Status: DISCONTINUED | COMMUNITY
End: 2023-06-16

## 2023-06-16 RX ORDER — CEPHALEXIN 500 MG/1
500 CAPSULE ORAL
Qty: 14 | Refills: 0 | Status: DISCONTINUED | COMMUNITY
Start: 2023-01-25 | End: 2023-06-16

## 2023-06-16 NOTE — HISTORY OF PRESENT ILLNESS
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 Underwent biopsy of glans 2/03, path: invasive SCC, extends to inked margin. \par Underwent CT scan yesterday, imaging available but not results. \par \par 4/21/23 s/p partial penectomy 3/21/23. Doing well, voiding well without issues\par Path: pT3, G2, moderately differentiated SCC infiltrates corpus cavernosum \par \par 6/16/23 s/p bilateral inguinal lymph node dissection, nodes negative \par Right ISABELLA drain fell out yesterday was draining < 30cc/day\par Left ISABELLA still in, draining serosang, < 30cc/day \par No pain, fluctuance

## 2023-06-16 NOTE — ASSESSMENT
[FreeTextEntry1] : 68yo male with invasive SCC of the penis diagnosed 2/2023\par s/p partial penectomy 3/21/23\par Path T3G2, negative margins\par CT 3/01/23 negative for adenopathy\par s/p bilateral groin dissection 6/01/23, benign\par \par drains are both out\par right groin incision with some granulation \par will treat with 1 week of Keflex prophylactically \par F/u 1 month for wound check

## 2023-06-26 ENCOUNTER — APPOINTMENT (OUTPATIENT)
Dept: UROLOGY | Facility: CLINIC | Age: 68
End: 2023-06-26
Payer: MEDICARE

## 2023-06-26 VITALS
WEIGHT: 266 LBS | HEART RATE: 92 BPM | HEIGHT: 67 IN | SYSTOLIC BLOOD PRESSURE: 127 MMHG | BODY MASS INDEX: 41.75 KG/M2 | DIASTOLIC BLOOD PRESSURE: 75 MMHG

## 2023-06-26 PROCEDURE — 99024 POSTOP FOLLOW-UP VISIT: CPT

## 2023-06-26 NOTE — ASSESSMENT
[FreeTextEntry1] : Patient is a 67-year-old male with penile cancer who underwent a partial penectomy and bilateral inguinal lymphadenectomy at [facility] by Dr. Garcia.  His drains came out about 1 week ago and he was placed on 1 week of Keflex.  He still has some persistent swelling in the right drain site but both inguinal incisions are well-healed.  He denies any fevers or chills.  On exam today there is some mild to moderate swelling over the right drain site but no purulent drainage can be expressed.  There is no fluctuance or crepitance in the area.\par \par Assessment plan\par 1.  Penile cancer\par 2.  Swelling and edema at the right inguinal drain site\par Given the high incidence of infection I will give him an additional week of Keflex 500 mg p.o. twice daily.  He will return to the office in 1 week for reexamination.

## 2023-06-26 NOTE — PHYSICAL EXAM
[General Appearance - Well Developed] : well developed [Normal Appearance] : normal appearance [General Appearance - In No Acute Distress] : no acute distress [Abdomen Soft] : soft [Abdomen Hernia] : no hernia was discovered [Urethral Meatus] : meatus normal [FreeTextEntry1] : Partial penectomy incision is well-healed.  Bilateral inguinal incisions are essentially healed the drain site on the left is normal the drain site on the right is slightly edematous and erythematous.  There is no heat to touch or drainage from the drain site. [] : no respiratory distress [Respiration, Rhythm And Depth] : normal respiratory rhythm and effort [Oriented To Time, Place, And Person] : oriented to person, place, and time [Inguinal Lymph Nodes Enlarged Bilaterally] : inguinal

## 2023-06-27 NOTE — ASSESSMENT
[FreeTextEntry1] : 67-year-old male with penile cancer status post partial penectomy followed by bilateral inguinal lymph node dissection 1 week ago.  He is here today for possible drain removal but on discussion with the daughter and patient he is still producing more than 50 mL/day from the drains.  The incisions appear intact and healing well and he denies any fevers or chills.\par \par Assessment and plan\par 1.  Penile cancer\par We will leave the drains over the weekend and and if they continue to decrease and put out less than 50 mL/day we will remove them.  He will follow-up with Dr. Garcia for long-term follow-up and therapy.

## 2023-06-27 NOTE — PHYSICAL EXAM
[General Appearance - Well Developed] : well developed [Normal Appearance] : normal appearance [General Appearance - In No Acute Distress] : no acute distress [FreeTextEntry1] : Groin incisions intact, bilateral JPs in place [] : no respiratory distress [Respiration, Rhythm And Depth] : normal respiratory rhythm and effort [Oriented To Time, Place, And Person] : oriented to person, place, and time

## 2023-07-07 ENCOUNTER — APPOINTMENT (OUTPATIENT)
Dept: UROLOGY | Facility: CLINIC | Age: 68
End: 2023-07-07
Payer: MEDICARE

## 2023-07-07 VITALS
DIASTOLIC BLOOD PRESSURE: 77 MMHG | RESPIRATION RATE: 16 BRPM | OXYGEN SATURATION: 100 % | HEART RATE: 101 BPM | SYSTOLIC BLOOD PRESSURE: 128 MMHG

## 2023-07-07 PROCEDURE — 99024 POSTOP FOLLOW-UP VISIT: CPT

## 2023-07-07 NOTE — PHYSICAL EXAM
[General Appearance - In No Acute Distress] : no acute distress [Abdomen Soft] : soft [Abdomen Tenderness] : non-tender [Costovertebral Angle Tenderness] : no ~M costovertebral angle tenderness [FreeTextEntry1] : left groin incision c/d/i; right groin incision with some mild swelling, no erythema or fluctuance

## 2023-07-07 NOTE — HISTORY OF PRESENT ILLNESS
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 Underwent biopsy of glans 2/03, path: invasive SCC, extends to inked margin. \par Underwent CT scan yesterday, imaging available but not results. \par \par 4/21/23 s/p partial penectomy 3/21/23. Doing well, voiding well without issues\par Path: pT3, G2, moderately differentiated SCC infiltrates corpus cavernosum \par \par 6/16/23 s/p bilateral inguinal lymph node dissection, nodes negative \par Right ISABELLA drain fell out yesterday was draining < 30cc/day\par Left ISABELLA still in, draining serosang, < 30cc/day \par No pain, fluctuance \par \par 7/7/23 here for wound check \par Saw Dr. Lal last week for concern about right groin swelling, started on another week of Keflex \par Denies drainage, pain, fever, chills  [Fever] : no fever [Incisional Drainage] : no incisional drainage [Incisional Pain] : no incisional pain

## 2023-07-07 NOTE — ASSESSMENT
[FreeTextEntry1] : 68yo male with invasive SCC of the penis diagnosed 2/2023\par s/p partial penectomy 3/21/23\par Path T3G2, negative margins\par CT 3/01/23 negative for adenopathy\par s/p bilateral groin dissection 6/01/23, benign\par \par Here for wound check\par Right groin with mild swelling, likely small seroma/lymphocele \par Finish antibiotics\par \par F/u 2 weeks

## 2023-07-11 ENCOUNTER — APPOINTMENT (OUTPATIENT)
Dept: PULMONOLOGY | Facility: CLINIC | Age: 68
End: 2023-07-11
Payer: MEDICARE

## 2023-07-11 VITALS
WEIGHT: 266.6 LBS | HEIGHT: 67.72 IN | BODY MASS INDEX: 40.88 KG/M2 | TEMPERATURE: 97.8 F | HEART RATE: 98 BPM | OXYGEN SATURATION: 95 % | SYSTOLIC BLOOD PRESSURE: 105 MMHG | DIASTOLIC BLOOD PRESSURE: 65 MMHG

## 2023-07-11 DIAGNOSIS — F17.200 NICOTINE DEPENDENCE, UNSPECIFIED, UNCOMPLICATED: ICD-10-CM

## 2023-07-11 PROCEDURE — 99203 OFFICE O/P NEW LOW 30 MIN: CPT

## 2023-07-11 NOTE — ASSESSMENT
[FreeTextEntry1] : 68 yo M w/ DM, CHF, HTN, HLD, current smoker (33py) referred by PCP for abnormal CT chest screening.\par \par > Mediastinal adenopathy\par > Pulmonary nodules\par > current smoker\par \par - Although no dominant nodule on CT chest, he has a history of penile squamous cancer. On pathology review both inguinal lymph nodes had no identified lymph nodes with just fibroadipose tissue, and there is concern for possible metastatic disease. Other differentials include inflammatory of infectious etiology, but he has no current symptoms suggestive of any of these\par - Plan for EBUS. But in prior conversation with PCP, he will also obtain PET/CT. Given he was able to tolerate recent surgery for his penile CA resection, likely can tolerate procedure despite his CHF. Patient understands that there are cardiovascular and pulmonary risks despite that, but wishes to proceed with it if possible. \par - Will obtain PFT\par \par f/u via phone after PFT completed and EBUS date decided.

## 2023-07-11 NOTE — HISTORY OF PRESENT ILLNESS
[TextBox_4] : 66 yo M w/ DM, CHF, HTN, HLD, current smoker (33py) referred by PCP for abnormal CT chest screening.\par Currently has no respiratory issues including dyspnea (both at rest and with exertion), wheezing, coughing, chest pain, etc. Also has no constitutional symptoms including fevers, night sweats, unintentional weight loss. He also has never been diagnosed with any pulmonary condition and has never used any inhalers. CT LCS showed mediastinal lymphadenopathy including right lower paratracheal 3.0 x 1.3 cm \par lymph node and 2.9 x 1.4 cm lymph node; Bilateral lower lobe groundglass changes and atelectasis. Scattered emphysematous changes bilaterally.

## 2023-07-28 ENCOUNTER — APPOINTMENT (OUTPATIENT)
Dept: UROLOGY | Facility: CLINIC | Age: 68
End: 2023-07-28
Payer: MEDICAID

## 2023-07-28 VITALS
HEART RATE: 96 BPM | HEIGHT: 67 IN | SYSTOLIC BLOOD PRESSURE: 106 MMHG | WEIGHT: 266 LBS | DIASTOLIC BLOOD PRESSURE: 70 MMHG | BODY MASS INDEX: 41.75 KG/M2

## 2023-07-28 DIAGNOSIS — R91.8 OTHER NONSPECIFIC ABNORMAL FINDING OF LUNG FIELD: ICD-10-CM

## 2023-07-28 PROCEDURE — 99024 POSTOP FOLLOW-UP VISIT: CPT

## 2023-07-28 NOTE — ASSESSMENT
[FreeTextEntry1] : Patient with a history of penile cancer status post partial penectomy and inguinal lymph node dissection done at [facility].  He is doing well and on examination today his inguinal lymph node dissection sites are well-healed as is his partial penectomy site.  He is here today to review recent PET scan that shows some questionable mediastinal and inguinal lymphadenopathy.  I discussed the results with the patient and the daughter.  I agree with the referral to interventional radiology to get biopsies of these lymph nodes.\par \par Assessment and plan\par 1.  Penile cancer\par 2.  Questionable lymphadenopathy\par He is being set up for percutaneous biopsies of the suspicious lymph nodes.  He will follow-up with Dr. Garcia regarding those results.

## 2023-07-28 NOTE — PHYSICAL EXAM
[General Appearance - Well Developed] : well developed [Normal Appearance] : normal appearance [General Appearance - In No Acute Distress] : no acute distress [Abdomen Soft] : soft [Abdomen Hernia] : no hernia was discovered [Costovertebral Angle Tenderness] : no ~M costovertebral angle tenderness [Urethral Meatus] : meatus normal [Penis Abnormality] : normal circumcised penis [Scrotum] : the scrotum was normal [Epididymis] : the epididymides were normal [Testes Tenderness] : no tenderness of the testes [Testes Mass (___cm)] : there were no testicular masses [] : no respiratory distress [Respiration, Rhythm And Depth] : normal respiratory rhythm and effort [Oriented To Time, Place, And Person] : oriented to person, place, and time [Inguinal Lymph Nodes Enlarged Bilaterally] : inguinal

## 2023-08-02 ENCOUNTER — APPOINTMENT (OUTPATIENT)
Dept: GASTROENTEROLOGY | Facility: CLINIC | Age: 68
End: 2023-08-02
Payer: MEDICARE

## 2023-08-02 VITALS
WEIGHT: 266 LBS | HEIGHT: 67 IN | BODY MASS INDEX: 41.75 KG/M2 | SYSTOLIC BLOOD PRESSURE: 120 MMHG | DIASTOLIC BLOOD PRESSURE: 80 MMHG

## 2023-08-02 DIAGNOSIS — E78.5 HYPERLIPIDEMIA, UNSPECIFIED: ICD-10-CM

## 2023-08-02 DIAGNOSIS — I10 ESSENTIAL (PRIMARY) HYPERTENSION: ICD-10-CM

## 2023-08-02 DIAGNOSIS — R94.8 ABNORMAL RESULTS OF FUNCTION STUDIES OF OTHER ORGANS AND SYSTEMS: ICD-10-CM

## 2023-08-02 PROCEDURE — 99204 OFFICE O/P NEW MOD 45 MIN: CPT

## 2023-08-02 NOTE — PHYSICAL EXAM
[Alert] : alert [Normal Voice/Communication] : normal voice/communication [Healthy Appearing] : healthy appearing [No Acute Distress] : no acute distress [Sclera] : the sclera and conjunctiva were normal [Hearing Threshold Finger Rub Not [place]] : hearing was normal [Normal Lips/Gums] : the lips and gums were normal [Oropharynx] : the oropharynx was normal [Normal Appearance] : the appearance of the neck was normal [No Neck Mass] : no neck mass was observed [No Respiratory Distress] : no respiratory distress [No Acc Muscle Use] : no accessory muscle use [Respiration, Rhythm And Depth] : normal respiratory rhythm and effort [Auscultation Breath Sounds / Voice Sounds] : lungs were clear to auscultation bilaterally [Heart Rate And Rhythm] : heart rate was normal and rhythm regular [Normal S1, S2] : normal S1 and S2 [Murmurs] : no murmurs [Bowel Sounds] : normal bowel sounds [Abdomen Tenderness] : non-tender [No Masses] : no abdominal mass palpated [Abdomen Soft] : soft [] : no hepatosplenomegaly [Oriented To Time, Place, And Person] : oriented to person, place, and time

## 2023-08-03 ENCOUNTER — RESULT REVIEW (OUTPATIENT)
Age: 68
End: 2023-08-03

## 2023-08-04 NOTE — ASSESSMENT
[FreeTextEntry1] : Abnormal PET/CT with uptake in rectal/distal sigmoid colon .  Risks (including but not limited to sedation risks, infection, bleeding, perforation, possibility of missed lesions), benefits and alternatives to procedure, including not doing the procedure, were discussed with patient. Patient understood and agreed to proceed with colonoscopy.  Colonoscopy preparation instructions reviewed with patient. Golytely  hold metformin/ozempic on day of the procedure

## 2023-08-04 NOTE — HISTORY OF PRESENT ILLNESS
[FreeTextEntry1] : 67 year M htn, hld, dm2,  OA, + current tobacco use, pulmonary nodules, penile ca s/p partial penectomy 03/2023, inguinal N dissection at [facility], recent PET CT with mediastinal lymphadenopathy/EBUS and IR guided LN bx planned, also with abnormal uptake in sigmoid colon . He is seen at the request of Dr. Bethel Flores  he is from [facility] originally,  joined by his fried prior colonoscopy 10 years ago in [place], told it was normal.   Patient denies abdominal pain , n/v/heartburn, reflux, dysphagia/odynophagia, change in bowel habits, diarrhea, constipation, brbpr, melena. no weight loss.  Good appetite and energy level. Patient has daily BM, denies regular NSAID use.   fam hx- negative for colon polyps , colon cancer

## 2023-08-22 NOTE — PRE-OP CHECKLIST - BSA (M2)
"    New Medical Weight Management Consult    PATIENT:  Diana Nelson  MRN:         [id number]  :         1991  OLVIN:         2023    Dear PCP,    I had the pleasure of seeing your patient, Diana Nelson. Full intake/assessment was done to determine barriers to weight loss success and develop a treatment plan. Diana Nelson is a 31 year old female interested in treatment of medical problems associated with excess weight. She has a height of 5' 9\", a weight of 248 lbs 0 oz, and the calculated Body mass index is 36.62 kg/m .    She has the following co-morbidities: depression and anxiety, left ankle pain    Weight history: hard time losing weight over the past several years although she reports eating healthy and stays active.   Was on Depot-Provera -- did not gain weight with it.    In 2018 - lost down 200 lbs -- with diet and exercise    Attempts: different diets but did not sustain weight loss    Eating habit: skip meals sometimes, cook and eat healthy, snack on some TrailMix.  Some traditional food or pasta. No fast food.   No pop, usually drinks ice coffee    Sleep: 6-7 hours per night  Exercise: regular exercise 3-4 times per week, strength training and cardio  Job: office job    She is not currently on Adderall.         2023     6:55 PM   --   I have the following health issues associated with obesity None of the above   I have the following symptoms associated with obesity Knee Pain    Depression    Lower Extremity Swelling    Back Pain    Groin Rash    Hip Pain    Irregular Menstral Cycle         2023     6:55 PM   Referring Provider   Please name the provider who referred you to Medical Weight Management  If you do not know, please answer \"I Don't Know\" I dont lnow           2023     6:55 PM   Weight History   How concerned are you about your weight? Very Concerned   I became overweight As a Child   The following factors have contributed to my weight gain Eating Wrong Types of Food "    Eating Too Much    Stress   I have tried the following methods to lose weight Watching Portions or Calories    Exercise    Meal Replacements    Fasting   My lowest weight since age 18 was 200   My highest weight since age 18 was 260   The most weight I have ever lost was (lbs) 25   I have the following family history of obesity/being overweight I am the only one in my immediate family who is overweight   How has your weight changed over the last year? Gained   How many pounds? 30+           8/21/2023     6:55 PM   Diet Recall Review with Patient   If you do eat breakfast, what types of food do you eat? Oatmeal, eggs   If you do eat supper, what types of food do you typically eat? [place], veggies, pasta   If you do snack, what types of food do you typically eat? Trailmix   How many glasses of juice do you drink in a typical day? 1   How many of glasses of milk do you drink in a typical day? 0   How many 8oz glasses of sugar containing drinks such as Carlos-Aid/sweet tea do you drink in a day? 0   How many cans/bottles of sugar pop/soda/tea/sports drinks do you drink in a day? 0   How many cans/bottles of diet pop/soda/tea or sports drink do you drink in a day? 0   How often do you have a drink of alcohol? Never           8/21/2023     6:55 PM   Eating Habits   Generally, my meals include foods like these bread, pasta, rice, potatoes, corn, crackers, sweet dessert, pop, or juice Less Than Weekly   Generally, my meals include foods like these fried meats, brats, burgers, french fries, pizza, cheese, chips, or ice cream Never   Eat fast food (like McDonalds, Burger Shola, Taco Bell) Never   Eat at a buffet or sit-down restaurant Never   Eat most of my meals in front of the TV or computer Never   Often skip meals, eat at random times, have no regular eating times Almost Everyday   Rarely sit down for a meal but snack or graze throughout A Few Times a Week   Eat extra snacks between meals Once a Week   Eat most of my food  at the end of the day Once a Week   Eat in the middle of the night or wake up at night to eat Once a Week   Eat extra snacks to prevent or correct low blood sugar Never   Eat to prevent acid reflux or stomach pain Never   Worry about not having enough food to eat Never   I eat when I am depressed Never   I eat when I am stressed Less Than Weekly   I eat when I am bored Less Than Weekly   I eat when I am anxious Never   I eat when I am happy or as a reward Never   I feel hungry all the time even if I just have eaten Never   Feeling full is important to me Never   I finish all the food on my plate even if I am already full Never   I can't resist eating delicious food or walk past the good food/smell Never   I eat/snack without noticing that I am eating Less Than Weekly   I eat when I am preparing the meal Never   I eat more than usual when I see others eating Less Than Weekly   I have trouble not eating sweets, ice cream, cookies, or chips if they are around the house Never   I think about food all day Once a Week   What foods, if any, do you crave? None   Please list any other foods you crave?  Food           8/21/2023     6:55 PM   Amount of Food   I feel out of control when eating Never   I eat a large amount of food, like a loaf of bread, a box of cookies, a pint/quart of ice cream, all at once Never   I eat a large amount of food even when I am not hungry Never   I eat rapidly Never   I eat alone because I feel embarrassed and do not want others to see how much I have eaten Never   I eat until I am uncomfortably full Never   I feel bad, disgusted, or guilty after I overeat Never           8/21/2023     6:55 PM   Activity/Exercise History   How much of a typical 12 hour day do you spend sitting? Half the Day   How much of a typical 12 hour day do you spend lying down? Less Than Half the Day   How much of a typical day do you spend walking/standing? Less Than Half the Day   How many hours (not including  "work) do you spend on the TV/Video Games/Computer/Tablet/Phone? 1 Hour or Less   How many times a week are you active for the purpose of exercise? 2-3 Times a Week   What keeps you from being more active? Other   How many total minutes do you spend doing some activity for the purpose of exercising when you exercise? More Than 30 Minutes       PAST MEDICAL HISTORY:  Past Medical History:   Diagnosis Date    ADD (attention deficit disorder)     Depressive disorder     NO ACTIVE PROBLEMS            8/21/2023     6:55 PM   Work/Social History Reviewed With Patient   My employment status is Full-Time   My job is Admin   How much of your job is spent on the computer or phone? 75%   How many hours do you spend commuting to work daily? 15 -20Mins   What is your marital status? Single   Who do you live with? Family   Who does the food shopping? Me or my sister           8/21/2023     6:55 PM   Mental Health History Reviewed With Patient   Have you ever been physically or sexually abused? No   How often in the past 2 weeks have you felt little interest or pleasure in doing things? Not at all   Over the past 2 weeks how often have you felt down, depressed, or hopeless? Not at all           8/21/2023     6:55 PM   Sleep History Reviewed With Patient   How many hours do you sleep at night? 7       MEDICATIONS:   Current Outpatient Medications   Medication Sig Dispense Refill    Atomoxetine HCl (STRATTERA PO)       HYDROcodone-acetaminophen (NORCO) 5-325 MG per tablet Take 2 tablets by mouth every 4 hours as needed for moderate to severe pain 10 tablet 0    ondansetron (ZOFRAN ODT) 4 MG disintegrating tablet Take 1 tablet (4 mg) by mouth every 6 hours as needed for nausea 15 tablet 0    VENLAFAXINE HCL PO Take 75 mg by mouth         ALLERGIES:   Allergies   Allergen Reactions    Shellfish Allergy        PHYSICAL EXAM:  Ht 1.753 m (5' 9\")   Wt 112.5 kg (248 lb)   BMI 36.62 kg/m      Waist circumference:      Wt Readings from " Last 4 Encounters:   08/22/23 112.5 kg (248 lb)   05/14/16 86.2 kg (190 lb)   07/16/14 94.3 kg (208 lb)   08/26/05 74.8 kg (165 lb) (96 %, Z= 1.80)*     * Growth percentiles are based on [facility] (Girls, 2-20 Years) data.     A & O x 3  HEENT: NCAT, mucous membranes moist  Respirations unlabored  Location of obesity: Mixed Obesity      ASSESSMENT/PLAN:  Diana is a patient with mature onset obesity without significant element of familial/genetic influence and without current health consequences. She does not need aggressive weight loss plan.  Diana Nelson has a disorganized meal pattern.    Her problem is complicated by a hunger disorder and strong craving/reward pathways    Her ability to lose weight is impacted by current work life and lack of confidence.    PLAN:    Decrease eating out  Purge house of food triggers  No meal skipping  Calorie/low fat diet  Meal planning    Programmatic/Healthy Living  Ancillary testing:  N/A.  Food Plan:  N/A.  Activity Plan:  Exercise after meals.  Supplementary:   N/A.    Medication:  The patient will begin medication in pursuit of improved medical status as influenced by body weight. She will start Wegovy 0.25 mg weekly x4 weeks;  increase to 0.5 mg weekly x4 weeks;  increase to 1.0 mg weekly for 4 weeks;  increase to 1.7 mg weekly for 4 weeks;  increase to 2.4 mg weekly thereafter    There is a mutual understanding of the goals and risks of this therapy. The patient is in agreement. She is educated on dosage regimen and possible side effects.    Refer nutritionist    FOLLOW-UP:   See Lauren Bloch, PharmD in 2 month(s)  See Dr.Tasma BOSWELL in 4 month(s)    Joined the call at 8/22/2023, 9:18:53 am.  Left the call at 8/22/2023, 9:34:30 am.  You were on the call for 15 minutes 36 seconds .    External notes/medical records independently reviewed, labs and imaging independently reviewed, medical management and tests to be discussed/communicated to patient.    Time: I spent 32 minutes spent  on the date of the encounter preparing to see patient (including chart review and preparation), obtaining and or reviewing additional medical history, performing a physical exam and evaluation, documenting clinical information in the electronic health record, independently interpreting results, communicating results to the patient and coordinating care.    Sincerely,    Jacquelyn Evans MD         2.28

## 2023-08-24 ENCOUNTER — APPOINTMENT (OUTPATIENT)
Dept: HEMATOLOGY ONCOLOGY | Facility: CLINIC | Age: 68
End: 2023-08-24
Payer: MEDICARE

## 2023-08-24 ENCOUNTER — RESULT REVIEW (OUTPATIENT)
Age: 68
End: 2023-08-24

## 2023-08-24 VITALS
OXYGEN SATURATION: 96 % | HEART RATE: 97 BPM | SYSTOLIC BLOOD PRESSURE: 122 MMHG | DIASTOLIC BLOOD PRESSURE: 69 MMHG | WEIGHT: 270.44 LBS | RESPIRATION RATE: 18 BRPM | TEMPERATURE: 97.8 F | BODY MASS INDEX: 42.45 KG/M2 | HEIGHT: 67 IN

## 2023-08-24 PROCEDURE — 99205 OFFICE O/P NEW HI 60 MIN: CPT | Mod: 25

## 2023-08-24 NOTE — RESULTS/DATA
[FreeTextEntry1] : Prior records including imaging, procedure notes, path reviewed analyzed and discussed

## 2023-08-24 NOTE — HISTORY OF PRESENT ILLNESS
[de-identified] : Mr. Jovi Martinez is 67 years old male with mediastinal lymphadenopathy here for consultation, referred by Dr. Liborio Garcia.  Accompanied by his daughter  Patient with hx of diabetes, HTN, hyperlipidemia, penile neoplasm ( invasive SCC s/p partial penectomy 3/2023, inguinal dissection at [facility], recent Pet/CT scan with mediastinal LN.   5/30/2023 lung screening Mediastinum/hilum: Mediastinal lymphadenopathy including right lower paratracheal 3.0 x 1.3 cm lymph node (series 4 image 22) and 2.9 x 1.4 cm lymph node (series 4 image 28). Substernal extension left thyroid lobe. Pulmonary nodules: Right upper lobe 0.3 cm anterior subpleural nodule (series 3 image 112). Left lower lobe 0.3 cm nodule (series 3 image 122). Right middle lobe 0.2 cm possibly calcified nodule (series 3 image 135). Lung Rads Category :  2S  7/19/2023 Pet/CT  1.  Hypermetabolic mediastinal/hilar, celiac and right inguinal lymphadenopathy concerning for malignant involvement. This includes a 1.7 cm Station 1R/low cervical lymph node, SUV max 9.6. No focal uptake above background in remnant penile tissue. 2.  There is an appearance of mildly asymmetric uptake of the right rectal/distal sigmoid wall which may be due to misregistration and is difficult to separate from diffuse physiologic and metformin related uptake, SUV max 10.5. Correlation with recent colonoscopy results is recommended.  3.  Thin subcutaneous fluid in the region of the right anteromedial thigh status post lymph node dissection with mildly increased uptake, SUV max 2.8, likely inflammatory. 4.  Small left pleural effusion.  8/3/2023 A. LYMPH NODE, STATION 10L, EBUS, FNA: Negative for malignant cells  - Polymorphic lymphoid cells and bronchial cells, see comment. B. LYMPH NODE, STATION 7, EBUS, FNA: Negative for malignant cells  - Polymorphic lymphoid cells and bronchial cells, see comment. C. LYMPH NODE, STATION 4R, EBUS, FNA: Atypia  - Few non-necrotizing granuloma in a background of atypical bronchial cells and polymorphic lymphoid cells D. BRONCHIAL WASHING: Atypia - Atypical bronchial cells in a background of macrophages and chronic inflammatory cells, see comment. E. BAL: Atypia - Atypical bronchial cells in a background of macrophages and chronic inflammatory cells, see comment.  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

## 2023-08-24 NOTE — ASSESSMENT
[FreeTextEntry1] : Generalized lymphadenopathy PET/CT(7/19/23) hypermetabolic mediastinal, hilar, celiac and right inguinal lymphadenopathy.  Most FDG avid lymph node: Right inguinal lymph node Status post bilateral inguinal node dissection with Dr. Claros on 6/1/2023-Path shows no lymph node identified.  Fibroadipose tissue. Subsequently underwent EBUS, FNA which was negative for any malignant cells Chronic active smoker, unwilling to quit  Prior records including imaging, procedure notes, path reviewed analyzed and discussed Discussed at length about differential diagnosis including infectious, inflammatory, autoimmune or malignant process At this time, I recommend excision of the most PET avid right inguinal lymph node Discussed with Dr. Lal-he will consider surgery along with general surgery/vascular teams at [facility] [place] Send blood work including CBC< CMP, ESr, CRP, LDH, ACE, GLO etc  Penile SCC Status postbiopsy of plans (2/03): Invasive SCC s/p partial penectomy (4/21/23) - Path: pT3, G2, moderately differentiated SCC infiltrates corpus cavernosum Follows with Dr Claros  Smoking cessation counselled at length. He is not prepared to quit for now  Patient and daughter (Lucila)  had multiple questions which were answered to satisfaction  Follow up in a few weeks  NO labs  COntact: Lucila (Daughter) [phone]

## 2023-08-29 ENCOUNTER — NON-APPOINTMENT (OUTPATIENT)
Age: 68
End: 2023-08-29

## 2023-09-21 ENCOUNTER — APPOINTMENT (OUTPATIENT)
Dept: UROLOGY | Facility: CLINIC | Age: 68
End: 2023-09-21
Payer: MEDICARE

## 2023-09-21 VITALS
HEIGHT: 67 IN | HEART RATE: 101 BPM | DIASTOLIC BLOOD PRESSURE: 75 MMHG | WEIGHT: 270 LBS | SYSTOLIC BLOOD PRESSURE: 120 MMHG | BODY MASS INDEX: 42.38 KG/M2

## 2023-09-21 PROCEDURE — 99214 OFFICE O/P EST MOD 30 MIN: CPT

## 2023-09-26 ENCOUNTER — APPOINTMENT (OUTPATIENT)
Dept: SURGERY | Facility: CLINIC | Age: 68
End: 2023-09-26
Payer: MEDICARE

## 2023-09-26 VITALS — DIASTOLIC BLOOD PRESSURE: 83 MMHG | SYSTOLIC BLOOD PRESSURE: 132 MMHG | HEART RATE: 94 BPM | TEMPERATURE: 97.4 F

## 2023-09-26 PROCEDURE — 99204 OFFICE O/P NEW MOD 45 MIN: CPT

## 2023-10-05 ENCOUNTER — APPOINTMENT (OUTPATIENT)
Dept: HEMATOLOGY ONCOLOGY | Facility: CLINIC | Age: 68
End: 2023-10-05

## 2023-11-15 ENCOUNTER — APPOINTMENT (OUTPATIENT)
Dept: SURGERY | Facility: CLINIC | Age: 68
End: 2023-11-15

## 2023-11-29 ENCOUNTER — RESULT REVIEW (OUTPATIENT)
Age: 68
End: 2023-11-29

## 2023-11-30 ENCOUNTER — TRANSCRIPTION ENCOUNTER (OUTPATIENT)
Age: 68
End: 2023-11-30

## 2023-11-30 ENCOUNTER — APPOINTMENT (OUTPATIENT)
Dept: SURGERY | Facility: HOSPITAL | Age: 68
End: 2023-11-30

## 2023-12-04 ENCOUNTER — NON-APPOINTMENT (OUTPATIENT)
Age: 68
End: 2023-12-04

## 2023-12-11 ENCOUNTER — APPOINTMENT (OUTPATIENT)
Dept: SURGERY | Facility: CLINIC | Age: 68
End: 2023-12-11
Payer: MEDICARE

## 2023-12-11 ENCOUNTER — NON-APPOINTMENT (OUTPATIENT)
Age: 68
End: 2023-12-11

## 2023-12-11 VITALS — DIASTOLIC BLOOD PRESSURE: 77 MMHG | TEMPERATURE: 99.5 F | SYSTOLIC BLOOD PRESSURE: 125 MMHG | HEART RATE: 123 BPM

## 2023-12-11 DIAGNOSIS — R59.0 LOCALIZED ENLARGED LYMPH NODES: ICD-10-CM

## 2023-12-11 PROCEDURE — 99024 POSTOP FOLLOW-UP VISIT: CPT

## 2023-12-12 ENCOUNTER — RESULT REVIEW (OUTPATIENT)
Age: 68
End: 2023-12-12

## 2023-12-12 ENCOUNTER — APPOINTMENT (OUTPATIENT)
Dept: HEMATOLOGY ONCOLOGY | Facility: CLINIC | Age: 68
End: 2023-12-12
Payer: MEDICARE

## 2023-12-12 VITALS
SYSTOLIC BLOOD PRESSURE: 113 MMHG | HEIGHT: 67 IN | OXYGEN SATURATION: 96 % | TEMPERATURE: 98 F | WEIGHT: 278 LBS | DIASTOLIC BLOOD PRESSURE: 67 MMHG | RESPIRATION RATE: 16 BRPM | HEART RATE: 113 BPM | BODY MASS INDEX: 43.63 KG/M2

## 2023-12-12 PROCEDURE — 99215 OFFICE O/P EST HI 40 MIN: CPT | Mod: 25

## 2023-12-12 PROCEDURE — G2212 PROLONG OUTPT/OFFICE VIS: CPT

## 2023-12-13 ENCOUNTER — TRANSCRIPTION ENCOUNTER (OUTPATIENT)
Age: 68
End: 2023-12-13

## 2023-12-17 ENCOUNTER — TRANSCRIPTION ENCOUNTER (OUTPATIENT)
Age: 68
End: 2023-12-17

## 2023-12-20 ENCOUNTER — APPOINTMENT (OUTPATIENT)
Dept: SURGERY | Facility: CLINIC | Age: 68
End: 2023-12-20
Payer: MEDICARE

## 2023-12-20 ENCOUNTER — NON-APPOINTMENT (OUTPATIENT)
Age: 68
End: 2023-12-20

## 2023-12-20 VITALS — TEMPERATURE: 97.5 F | SYSTOLIC BLOOD PRESSURE: 127 MMHG | DIASTOLIC BLOOD PRESSURE: 80 MMHG | HEART RATE: 105 BPM

## 2023-12-20 PROCEDURE — 99024 POSTOP FOLLOW-UP VISIT: CPT

## 2023-12-20 RX ORDER — SULFAMETHOXAZOLE AND TRIMETHOPRIM 800; 160 MG/1; MG/1
800-160 TABLET ORAL TWICE DAILY
Qty: 20 | Refills: 0 | Status: DISCONTINUED | COMMUNITY
Start: 2023-12-11 | End: 2023-12-20

## 2023-12-20 NOTE — ASSESSMENT
[FreeTextEntry1] : 69 yo M s/p groin incision infection, well-drained and now healing properly. Will call VNS to ensure proper wound care Will set up patient to see me in wound care next week. Continue packing every other day with AquaCell or alginate and vashe rinse with dressing changes. Can consider vac as well. Daughter and patient in agreement with plan

## 2023-12-20 NOTE — HISTORY OF PRESENT ILLNESS
[de-identified] : Patient returns for follow up. He is s/p inguinal LN biopsy c/b surgical site infection. He was treated with bedside drainage and abx and now returns for wound check. His VNS has been coming since discharge Saturday but not changing packing as requested. He has no fevers or pain and is otherwise feeling well.

## 2023-12-20 NOTE — PHYSICAL EXAM
[de-identified] : NAD, well-appearing [de-identified] : groin incision open with healthy granulation tissue; cavity extends superiorly at least 5 cm but all soft tissue pockets are well-drained. Wound irrigated with vashe and packed with aliginate. No surrounding erythema, drainage minimally purulent

## 2023-12-26 ENCOUNTER — APPOINTMENT (OUTPATIENT)
Dept: SURGERY | Facility: CLINIC | Age: 68
End: 2023-12-26

## 2024-01-02 ENCOUNTER — APPOINTMENT (OUTPATIENT)
Dept: SURGERY | Facility: CLINIC | Age: 69
End: 2024-01-02

## 2024-01-04 ENCOUNTER — APPOINTMENT (OUTPATIENT)
Dept: PULMONOLOGY | Facility: CLINIC | Age: 69
End: 2024-01-04

## 2024-01-09 ENCOUNTER — APPOINTMENT (OUTPATIENT)
Dept: SURGERY | Facility: CLINIC | Age: 69
End: 2024-01-09

## 2024-01-23 ENCOUNTER — APPOINTMENT (OUTPATIENT)
Dept: SURGERY | Facility: CLINIC | Age: 69
End: 2024-01-23

## 2024-01-24 ENCOUNTER — APPOINTMENT (OUTPATIENT)
Dept: UROLOGY | Facility: CLINIC | Age: 69
End: 2024-01-24
Payer: MEDICARE

## 2024-01-24 VITALS
BODY MASS INDEX: 43.63 KG/M2 | DIASTOLIC BLOOD PRESSURE: 71 MMHG | HEIGHT: 67 IN | HEART RATE: 102 BPM | WEIGHT: 278 LBS | SYSTOLIC BLOOD PRESSURE: 119 MMHG

## 2024-01-24 DIAGNOSIS — R59.0 LOCALIZED ENLARGED LYMPH NODES: ICD-10-CM

## 2024-01-24 PROCEDURE — 99213 OFFICE O/P EST LOW 20 MIN: CPT

## 2024-01-25 PROBLEM — R59.0 LYMPHADENOPATHY, MEDIASTINAL: Status: ACTIVE | Noted: 2023-07-11

## 2024-01-30 ENCOUNTER — APPOINTMENT (OUTPATIENT)
Dept: SURGERY | Facility: CLINIC | Age: 69
End: 2024-01-30

## 2024-02-01 ENCOUNTER — APPOINTMENT (OUTPATIENT)
Dept: HEMATOLOGY ONCOLOGY | Facility: CLINIC | Age: 69
End: 2024-02-01
Payer: MEDICARE

## 2024-02-01 VITALS
DIASTOLIC BLOOD PRESSURE: 73 MMHG | RESPIRATION RATE: 16 BRPM | TEMPERATURE: 97.5 F | HEART RATE: 105 BPM | WEIGHT: 287 LBS | SYSTOLIC BLOOD PRESSURE: 126 MMHG | HEIGHT: 67 IN | OXYGEN SATURATION: 92 % | BODY MASS INDEX: 45.04 KG/M2

## 2024-02-01 DIAGNOSIS — L02.214 CUTANEOUS ABSCESS OF GROIN: ICD-10-CM

## 2024-02-01 PROCEDURE — G2212 PROLONG OUTPT/OFFICE VIS: CPT

## 2024-02-01 PROCEDURE — 99215 OFFICE O/P EST HI 40 MIN: CPT | Mod: 25

## 2024-02-01 RX ORDER — POLYETHYLENE GLYCOL 3350 AND ELECTROLYTES WITH LEMON FLAVOR 236; 22.74; 6.74; 5.86; 2.97 G/4L; G/4L; G/4L; G/4L; G/4L
236 POWDER, FOR SOLUTION ORAL
Qty: 1 | Refills: 0 | Status: COMPLETED | COMMUNITY
Start: 2023-08-02 | End: 2024-02-01

## 2024-02-01 NOTE — ASSESSMENT
[Designated Health Care Proxy] : Designated Health Care Proxy [Name: ___] : Name: [unfilled] [Relationship: ___] : Relationship: [unfilled] [Full Code] : full code [FreeTextEntry1] : Metastatic squamous cell carcinoma PET/CT(7/19/23) hypermetabolic mediastinal, hilar, celiac and right inguinal lymphadenopathy. Most FDG avid lymph node: Right inguinal lymph node Status post bilateral inguinal node dissection with Dr. Ramirez on 6/1/2023-Path shows no lymph node identified. Fibroadipose tissue. Subsequently underwent EBUS, FNA which was negative for any malignant cells Chronic active smoker- apparenlty quit 1 week ago s/p excisional biopsy of the right inguinal LN Path cw Squamous cell ca ALE PDL1 10-15%  Discussed at length about the diagnosis, work up, staging, prognosis and treatment options Explained that this is likely mets from his penile ca vs less likely alternate primary such as lung given he is chronic smoker Overall this is not a curable disease nad the goal of the treatment would be to improve his QOL and longevity Repeat CT in December showed stable lymphadenopathy compared to the PET CT in July 2023 Explained that as such there is no standard of care, I would recommend carboplatin paclitaxel with immunotherapy I have reviewed the risks, benefits and side effects of chemotherapy with the patient. Explained about nausea, vomiting, diarrhea/constipation, hair loss, fatigue, mouth sores, infections, bleeding, kidney/liver damage, neuropathy, etc.  I have reviewed the risks, benefits and side effects of immunotherapy with the patient including inflammation in any part of the body from head to toe including thyroiditis, hypophysitis, pneumonitis, hepatitis, colitis, myositis, myocarditis, arthritis, neuropathy, etc. All questions were answered to satisfaction. Patient agrees to pursue the planned immunotherapy and chemotherapy. Information given to the patient in writing Plan: Send blood work including CBC, CMP, blood cultures.  Due to lack of availability of culture bottles patient will be sent to outpatient laboratory for blood Chemo-Port placement Carboplatin AUC 5 plus paclitaxel 150 mg/m plus Keytruda 200 mg every 3 weeks with Neulasta Dexamethasone 12 mg the night before in the a.m. of the treatment  Penile SCC Status postbiopsy of plans (2/03): Invasive SCC s/p partial penectomy (4/21/23) - Path: pT3, G2, moderately differentiated SCC infiltrates corpus cavernosum  Patient and daughter (Lucila) had multiple questions which were answered to satisfaction  Follow up in 2 weeks to begin chemo CBC, CMP, LDH, ESR, CRP  COntact: Lucila (Daughter) [phone].  [FreeTextEntry2] : Paramjit - [phone]

## 2024-02-01 NOTE — PHYSICAL EXAM
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

## 2024-02-01 NOTE — HISTORY OF PRESENT ILLNESS
[de-identified] : Mr. Jovi Martinez is 67 years old male with mediastinal lymphadenopathy here for consultation, referred by Dr. Liborio Garcia.  Accompanied by his daughter  Patient with hx of diabetes, HTN, hyperlipidemia, penile neoplasm ( invasive SCC s/p partial penectomy 3/2023, inguinal dissection at [facility], recent Pet/CT scan with mediastinal LN.   5/30/2023 lung screening Mediastinum/hilum: Mediastinal lymphadenopathy including right lower paratracheal 3.0 x 1.3 cm lymph node (series 4 image 22) and 2.9 x 1.4 cm lymph node (series 4 image 28). Substernal extension left thyroid lobe. Pulmonary nodules: Right upper lobe 0.3 cm anterior subpleural nodule (series 3 image 112). Left lower lobe 0.3 cm nodule (series 3 image 122). Right middle lobe 0.2 cm possibly calcified nodule (series 3 image 135). Lung Rads Category :  2S  7/19/2023 Pet/CT  1.  Hypermetabolic mediastinal/hilar, celiac and right inguinal lymphadenopathy concerning for malignant involvement. This includes a 1.7 cm Station 1R/low cervical lymph node, SUV max 9.6. No focal uptake above background in remnant penile tissue. 2.  There is an appearance of mildly asymmetric uptake of the right rectal/distal sigmoid wall which may be due to misregistration and is difficult to separate from diffuse physiologic and metformin related uptake, SUV max 10.5. Correlation with recent colonoscopy results is recommended.  3.  Thin subcutaneous fluid in the region of the right anteromedial thigh status post lymph node dissection with mildly increased uptake, SUV max 2.8, likely inflammatory. 4.  Small left pleural effusion.  8/3/2023 A. LYMPH NODE, STATION 10L, EBUS, FNA: Negative for malignant cells  - Polymorphic lymphoid cells and bronchial cells, see comment. B. LYMPH NODE, STATION 7, EBUS, FNA: Negative for malignant cells  - Polymorphic lymphoid cells and bronchial cells, see comment. C. LYMPH NODE, STATION 4R, EBUS, FNA: Atypia  - Few non-necrotizing granuloma in a background of atypical bronchial cells and polymorphic lymphoid cells D. BRONCHIAL WASHING: Atypia - Atypical bronchial cells in a background of macrophages and chronic inflammatory cells, see comment. E. BAL: Atypia - Atypical bronchial cells in a background of macrophages and chronic inflammatory cells, see comment.  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 S/p hospitalization for groin biopsy complications/infection 12/16-12/17/23 Being Seen by Wound care, going to clinic 1x per week, VNS every other day  Overall feeling well Will be seeing Dr Lal for f/u in 6 weeks  - cleared to start chemo from Uro standpoint  Denies any fevers, chills, reports having normal regular BMs and normal urination without pain   He is s/p excisional biopsy of the right inguinal node with Dr Perez (11/30/23) Path: Metastatic squamous cell carcinoma

## 2024-02-07 ENCOUNTER — NON-APPOINTMENT (OUTPATIENT)
Age: 69
End: 2024-02-07

## 2024-02-12 ENCOUNTER — RESULT REVIEW (OUTPATIENT)
Age: 69
End: 2024-02-12

## 2024-02-16 ENCOUNTER — RESULT REVIEW (OUTPATIENT)
Age: 69
End: 2024-02-16

## 2024-02-16 ENCOUNTER — NON-APPOINTMENT (OUTPATIENT)
Age: 69
End: 2024-02-16

## 2024-02-16 ENCOUNTER — APPOINTMENT (OUTPATIENT)
Dept: HEMATOLOGY ONCOLOGY | Facility: CLINIC | Age: 69
End: 2024-02-16
Payer: MEDICARE

## 2024-02-16 VITALS
OXYGEN SATURATION: 96 % | RESPIRATION RATE: 18 BRPM | HEART RATE: 130 BPM | DIASTOLIC BLOOD PRESSURE: 61 MMHG | HEIGHT: 67 IN | TEMPERATURE: 97.4 F | SYSTOLIC BLOOD PRESSURE: 113 MMHG | BODY MASS INDEX: 44.61 KG/M2 | WEIGHT: 284.19 LBS

## 2024-02-16 DIAGNOSIS — F17.210 NICOTINE DEPENDENCE, CIGARETTES, UNCOMPLICATED: ICD-10-CM

## 2024-02-16 DIAGNOSIS — Z79.899 OTHER LONG TERM (CURRENT) DRUG THERAPY: ICD-10-CM

## 2024-02-16 PROCEDURE — G2211 COMPLEX E/M VISIT ADD ON: CPT

## 2024-02-16 PROCEDURE — 99215 OFFICE O/P EST HI 40 MIN: CPT

## 2024-02-16 RX ORDER — LIDOCAINE AND PRILOCAINE 25; 25 MG/G; MG/G
2.5-2.5 CREAM TOPICAL
Qty: 1 | Refills: 1 | Status: ACTIVE | COMMUNITY
Start: 2024-02-16 | End: 1900-01-01

## 2024-02-16 RX ORDER — ONDANSETRON 4 MG/1
4 TABLET ORAL
Qty: 40 | Refills: 6 | Status: ACTIVE | COMMUNITY
Start: 2024-02-16 | End: 1900-01-01

## 2024-02-16 NOTE — PHYSICAL EXAM
[Restricted in physically strenuous activity but ambulatory and able to carry out work of a light or sedentary nature] : Status 1- Restricted in physically strenuous activity but ambulatory and able to carry out work of a light or sedentary nature, e.g., light house work, office work [Obese] : obese [Normal] : affect appropriate [de-identified] : In the right groin wound very well-healed

## 2024-02-16 NOTE — ASSESSMENT
[Designated Health Care Proxy] : Designated Health Care Proxy [Name: ___] : Name: [unfilled] [Relationship: ___] : Relationship: [unfilled] [Full Code] : full code [FreeTextEntry1] : Metastatic squamous cell carcinoma PET/CT(7/19/23) hypermetabolic mediastinal, hilar, celiac and right inguinal lymphadenopathy. Most FDG avid lymph node: Right inguinal lymph node Likely mets from his penile ca vs less likely alternate primary such as lung given he is chronic smoker Status post bilateral inguinal node dissection with Dr. Ramirez on 6/1/2023-Path shows no lymph node identified. Fibroadipose tissue. Subsequently underwent EBUS, FNA which was negative for any malignant cells Chronic active smoker- apparenlty quit 1 week ago s/p excisional biopsy of the right inguinal LN Path cw Squamous cell ca ALE PDL1 10-15%  Seen today for follow up and C1 Carboplatin paclitaxel and keytruda (2/16/24) Feels good overall I have reviewed the risks, benefits and side effects of chemotherapy with the patient. Explained about nausea, vomiting, diarrhea/constipation, hair loss, fatigue, mouth sores, infections, bleeding, kidney/liver damage, neuropathy, etc.  I have reviewed the risks, benefits and side effects of immunotherapy with the patient including inflammation in any part of the body from head to toe including thyroiditis, hypophysitis, pneumonitis, hepatitis, colitis, myositis, myocarditis, arthritis, neuropathy, etc. All questions were answered to satisfaction. Patient agrees to pursue the planned immunotherapy and chemotherapy. Labs ordered, drawn in the office, reviewed, analyzed and discussed Proceed with C1 chemo Zofran PRN for nausea Plan: Carboplatin AUC 5 plus paclitaxel 150 mg/m plus Keytruda 200 mg every 3 weeks with Neulasta Dexamethasone 12 mg the night before in the a.m. of the treatment  Penile SCC Status postbiopsy of plans (2/03): Invasive SCC s/p partial penectomy (4/21/23) - Path: pT3, G2, moderately differentiated SCC infiltrates corpus cavernosum  Patient and daughter (Lucila) had multiple questions which were answered to satisfaction  Follow up in 3 weeks for C2 CBC, CMP, LDH, ESR, CRP  COntact: Lucila (Daughter) [phone].  [FreeTextEntry2] : Paramjit - [phone]

## 2024-02-16 NOTE — HISTORY OF PRESENT ILLNESS
[de-identified] : Mr. Jovi Martinez is 67 years old male with mediastinal lymphadenopathy here for consultation, referred by Dr. Liborio Garcia.  Accompanied by his daughter  Patient with hx of diabetes, HTN, hyperlipidemia, penile neoplasm ( invasive SCC s/p partial penectomy 3/2023, inguinal dissection at [facility], recent Pet/CT scan with mediastinal LN.   5/30/2023 lung screening Mediastinum/hilum: Mediastinal lymphadenopathy including right lower paratracheal 3.0 x 1.3 cm lymph node (series 4 image 22) and 2.9 x 1.4 cm lymph node (series 4 image 28). Substernal extension left thyroid lobe. Pulmonary nodules: Right upper lobe 0.3 cm anterior subpleural nodule (series 3 image 112). Left lower lobe 0.3 cm nodule (series 3 image 122). Right middle lobe 0.2 cm possibly calcified nodule (series 3 image 135). Lung Rads Category :  2S  7/19/2023 Pet/CT  1.  Hypermetabolic mediastinal/hilar, celiac and right inguinal lymphadenopathy concerning for malignant involvement. This includes a 1.7 cm Station 1R/low cervical lymph node, SUV max 9.6. No focal uptake above background in remnant penile tissue. 2.  There is an appearance of mildly asymmetric uptake of the right rectal/distal sigmoid wall which may be due to misregistration and is difficult to separate from diffuse physiologic and metformin related uptake, SUV max 10.5. Correlation with recent colonoscopy results is recommended.  3.  Thin subcutaneous fluid in the region of the right anteromedial thigh status post lymph node dissection with mildly increased uptake, SUV max 2.8, likely inflammatory. 4.  Small left pleural effusion.  8/3/2023 A. LYMPH NODE, STATION 10L, EBUS, FNA: Negative for malignant cells  - Polymorphic lymphoid cells and bronchial cells, see comment. B. LYMPH NODE, STATION 7, EBUS, FNA: Negative for malignant cells  - Polymorphic lymphoid cells and bronchial cells, see comment. C. LYMPH NODE, STATION 4R, EBUS, FNA: Atypia  - Few non-necrotizing granuloma in a background of atypical bronchial cells and polymorphic lymphoid cells D. BRONCHIAL WASHING: Atypia - Atypical bronchial cells in a background of macrophages and chronic inflammatory cells, see comment. E. BAL: Atypia - Atypical bronchial cells in a background of macrophages and chronic inflammatory cells, see comment.  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 S/p hospitalization for groin biopsy complications/infection 12/16-12/17/23 Being Seen by Wound care, going to clinic 1x per week, VNS every other day  Overall feeling well Will be seeing Dr Lal for f/u in 6 weeks  - cleared to start chemo from Uro standpoint  Denies any fevers, chills, reports having normal regular BMs and normal urination without pain   He is s/p excisional biopsy of the right inguinal node with Dr Perez (11/30/23) Path: Metastatic squamous cell carcinoma    [de-identified] : He is seen today for follow up and begin C1 Carboplatin paclitaxel keytruda (2/16/24) Accompanied by his daughter  He had chemoport placed without any issues Co feeling sleepy the day of port placement Took dexamethasone 12 mg last night and today AM. Feels hungry Continues to smoke

## 2024-02-26 ENCOUNTER — APPOINTMENT (OUTPATIENT)
Dept: UROLOGY | Facility: CLINIC | Age: 69
End: 2024-02-26

## 2024-03-13 ENCOUNTER — NON-APPOINTMENT (OUTPATIENT)
Age: 69
End: 2024-03-13

## 2024-03-13 ENCOUNTER — RESULT REVIEW (OUTPATIENT)
Age: 69
End: 2024-03-13

## 2024-03-13 ENCOUNTER — APPOINTMENT (OUTPATIENT)
Dept: HEMATOLOGY ONCOLOGY | Facility: CLINIC | Age: 69
End: 2024-03-13

## 2024-03-13 VITALS
OXYGEN SATURATION: 92 % | HEIGHT: 67 IN | HEART RATE: 122 BPM | BODY MASS INDEX: 45.38 KG/M2 | DIASTOLIC BLOOD PRESSURE: 60 MMHG | TEMPERATURE: 97 F | SYSTOLIC BLOOD PRESSURE: 108 MMHG | WEIGHT: 289.13 LBS | RESPIRATION RATE: 16 BRPM

## 2024-03-14 ENCOUNTER — APPOINTMENT (OUTPATIENT)
Dept: GASTROENTEROLOGY | Facility: HOSPITAL | Age: 69
End: 2024-03-14

## 2024-03-19 ENCOUNTER — APPOINTMENT (OUTPATIENT)
Dept: HEMATOLOGY ONCOLOGY | Facility: CLINIC | Age: 69
End: 2024-03-19

## 2024-04-03 ENCOUNTER — RESULT REVIEW (OUTPATIENT)
Age: 69
End: 2024-04-03

## 2024-04-03 ENCOUNTER — APPOINTMENT (OUTPATIENT)
Dept: HEMATOLOGY ONCOLOGY | Facility: CLINIC | Age: 69
End: 2024-04-03
Payer: MEDICARE

## 2024-04-03 VITALS
HEART RATE: 128 BPM | BODY MASS INDEX: 44.48 KG/M2 | DIASTOLIC BLOOD PRESSURE: 77 MMHG | RESPIRATION RATE: 16 BRPM | SYSTOLIC BLOOD PRESSURE: 122 MMHG | WEIGHT: 283.38 LBS | TEMPERATURE: 97.3 F | HEIGHT: 67 IN | OXYGEN SATURATION: 94 %

## 2024-04-03 DIAGNOSIS — E11.9 TYPE 2 DIABETES MELLITUS W/OUT COMPLICATIONS: ICD-10-CM

## 2024-04-03 PROCEDURE — 99214 OFFICE O/P EST MOD 30 MIN: CPT

## 2024-04-03 PROCEDURE — G2211 COMPLEX E/M VISIT ADD ON: CPT | Mod: NC,1L

## 2024-04-04 ENCOUNTER — NON-APPOINTMENT (OUTPATIENT)
Age: 69
End: 2024-04-04

## 2024-04-04 ENCOUNTER — APPOINTMENT (OUTPATIENT)
Dept: HEART AND VASCULAR | Facility: CLINIC | Age: 69
End: 2024-04-04
Payer: MEDICARE

## 2024-04-04 VITALS
OXYGEN SATURATION: 95 % | BODY MASS INDEX: 44.6 KG/M2 | HEIGHT: 67 IN | HEART RATE: 114 BPM | WEIGHT: 284.13 LBS | SYSTOLIC BLOOD PRESSURE: 120 MMHG | DIASTOLIC BLOOD PRESSURE: 78 MMHG

## 2024-04-04 PROBLEM — E11.9 DIABETES MELLITUS: Status: ACTIVE | Noted: 2023-01-25

## 2024-04-04 PROCEDURE — 99204 OFFICE O/P NEW MOD 45 MIN: CPT | Mod: 25

## 2024-04-04 PROCEDURE — 36415 COLL VENOUS BLD VENIPUNCTURE: CPT

## 2024-04-04 PROCEDURE — 93000 ELECTROCARDIOGRAM COMPLETE: CPT

## 2024-04-04 RX ORDER — CARVEDILOL 12.5 MG/1
12.5 TABLET, FILM COATED ORAL TWICE DAILY
Qty: 180 | Refills: 3 | Status: ACTIVE | COMMUNITY
Start: 2024-04-04 | End: 1900-01-01

## 2024-04-04 NOTE — PHYSICAL EXAM
[Restricted in physically strenuous activity but ambulatory and able to carry out work of a light or sedentary nature] : Status 1- Restricted in physically strenuous activity but ambulatory and able to carry out work of a light or sedentary nature, e.g., light house work, office work [Obese] : obese [Normal] : affect appropriate [de-identified] : In the right groin wound very well-healed

## 2024-04-04 NOTE — ASSESSMENT
[Designated Health Care Proxy] : Designated Health Care Proxy [Relationship: ___] : Relationship: [unfilled] [Name: ___] : Name: [unfilled] [Full Code] : full code [FreeTextEntry1] : ## Metastatic squamous cell carcinoma PET/CT(7/19/23) hypermetabolic mediastinal, hilar, celiac and right inguinal lymphadenopathy. Most FDG avid lymph node: Right inguinal lymph node Likely mets from his penile ca vs less likely alternate primary such as lung given he is chronic smoker Status post bilateral inguinal node dissection with Dr. Ramirez on 6/1/2023-Path shows no lymph node identified. Fibroadipose tissue. Subsequently underwent EBUS, FNA which was negative for any malignant cells Chronic active smoker- apparenlty quit 1 week ago s/p excisional biopsy of the right inguinal LN Path cw Squamous cell ca ALE PDL1 10-15%  Patient is here for C3D1 Carboplatin paclitaxel and keytruda (2/16/24 - present) - Carboplatin was not received on C2 due to hyperglycemia.  His BS has been better controlled with diet changes - referral to see new endo with Christiano He's been tachycardic in the last two months but asymptomatic -  scheduled for patient to see cardiology tomorrow. -Labs are drawn in the office, reviewed, analyzed, and discussed -To continue with Decadron 8 mg the night before and 4 mg morning of chemo for Paclitaxel due to his diabetes and he's tolerating treatment with no adverse reaction.  - to continue with Zofran prn nausea proceed with treatment.  Plan: Carboplatin AUC 5 plus paclitaxel 150 mg/m plus Keytruda 200 mg every 3 weeks with Neulasta  Penile SCC Status post biopsy of plans (2/03): Invasive SCC s/p partial penectomy (4/21/23) - Path: pT3, G2, moderately differentiated SCC infiltrates corpus cavernosum  Patient and daughter (Lucila) had multiple questions which were answered to satisfaction  d/w Dr. Maria  Follow up in 3 weeks for C3 CBC, CMP, LDH, ESR, CRP  COntact: Lucila (Daughter) [phone].  [FreeTextEntry2] : Paramjit - [phone]

## 2024-04-04 NOTE — DISCUSSION/SUMMARY
[FreeTextEntry1] : 69 YO M with HTN, penile cancer S/P surgery with mets to LN on chemo now, with a old inf/anterior MI on EKG and EF: 38% who has no CP/SOB/CASAS/palpitations/syncope. Patient had been drinking/smoking when he was young but not now.  EKG is unchanged.   Cardiomyopathy Spoke to the patient regarding LHC and patient agrees.  TTE DC amlodipine  Continue the ACE inh/statin for now and will try to switch to entresto  Start Coreg 12.5 BID  No diuretic at this point  Will check labs  F/U with heart failure in the future and decide regarding ICD once optimized.  ASA 81mg daily   [EKG obtained to assist in diagnosis and management of assessed problem(s)] : EKG obtained to assist in diagnosis and management of assessed problem(s)

## 2024-04-04 NOTE — HISTORY OF PRESENT ILLNESS
[de-identified] : Mr. Jovi Martinez is 67 years old male with mediastinal lymphadenopathy here for consultation, referred by Dr. Liborio Garcia.  Accompanied by his daughter  Patient with hx of diabetes, HTN, hyperlipidemia, penile neoplasm ( invasive SCC s/p partial penectomy 3/2023, inguinal dissection at [facility], recent Pet/CT scan with mediastinal LN.   5/30/2023 lung screening Mediastinum/hilum: Mediastinal lymphadenopathy including right lower paratracheal 3.0 x 1.3 cm lymph node (series 4 image 22) and 2.9 x 1.4 cm lymph node (series 4 image 28). Substernal extension left thyroid lobe. Pulmonary nodules: Right upper lobe 0.3 cm anterior subpleural nodule (series 3 image 112). Left lower lobe 0.3 cm nodule (series 3 image 122). Right middle lobe 0.2 cm possibly calcified nodule (series 3 image 135). Lung Rads Category :  2S  7/19/2023 Pet/CT  1.  Hypermetabolic mediastinal/hilar, celiac and right inguinal lymphadenopathy concerning for malignant involvement. This includes a 1.7 cm Station 1R/low cervical lymph node, SUV max 9.6. No focal uptake above background in remnant penile tissue. 2.  There is an appearance of mildly asymmetric uptake of the right rectal/distal sigmoid wall which may be due to misregistration and is difficult to separate from diffuse physiologic and metformin related uptake, SUV max 10.5. Correlation with recent colonoscopy results is recommended.  3.  Thin subcutaneous fluid in the region of the right anteromedial thigh status post lymph node dissection with mildly increased uptake, SUV max 2.8, likely inflammatory. 4.  Small left pleural effusion.  8/3/2023 A. LYMPH NODE, STATION 10L, EBUS, FNA: Negative for malignant cells  - Polymorphic lymphoid cells and bronchial cells, see comment. B. LYMPH NODE, STATION 7, EBUS, FNA: Negative for malignant cells  - Polymorphic lymphoid cells and bronchial cells, see comment. C. LYMPH NODE, STATION 4R, EBUS, FNA: Atypia  - Few non-necrotizing granuloma in a background of atypical bronchial cells and polymorphic lymphoid cells D. BRONCHIAL WASHING: Atypia - Atypical bronchial cells in a background of macrophages and chronic inflammatory cells, see comment. E. BAL: Atypia - Atypical bronchial cells in a background of macrophages and chronic inflammatory cells, see comment.  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 S/p hospitalization for groin biopsy complications/infection 12/16-12/17/23 Being Seen by Wound care, going to clinic 1x per week, VNS every other day  Overall feeling well Will be seeing Dr Lal for f/u in 6 weeks  - cleared to start chemo from Uro standpoint  Denies any fevers, chills, reports having normal regular BMs and normal urination without pain   He is s/p excisional biopsy of the right inguinal node with Dr Perez (11/30/23) Path: Metastatic squamous cell carcinoma    [de-identified] : Patient is here for C3 Carboplatin paclitaxel keytruda (2/16/24 - present)  Accompanied by his daughter Lucila  His BS has been better controlled with being careful with his diet, still just on Metformin and Ozempic. His endocrinologist has retired.  Only been taking Decadron 8 mg the night before and 4 mg the morning of chemo in the last 3 treatments Continues to smoke

## 2024-04-04 NOTE — HISTORY OF PRESENT ILLNESS
[FreeTextEntry1] : 69 YO M with HTN, penile cancer S/P surgery with mets to LN on chemo now, with a old inf/anterior MI on EKG and EF: 38% who has no CP/SOB/CASAS/palpitations/syncope. Patient had been drinking/smoking when he was young but not now.  EKG is unchanged.

## 2024-04-05 LAB
ALBUMIN SERPL ELPH-MCNC: 4.4 G/DL
ALP BLD-CCNC: 79 U/L
ALT SERPL-CCNC: 23 U/L
ANION GAP SERPL CALC-SCNC: 18 MMOL/L
APTT BLD: 33.1 SEC
AST SERPL-CCNC: 18 U/L
BASOPHILS # BLD AUTO: 0.03 K/UL
BASOPHILS NFR BLD AUTO: 0.2 %
BILIRUB SERPL-MCNC: 0.3 MG/DL
BUN SERPL-MCNC: 24 MG/DL
CALCIUM SERPL-MCNC: 10 MG/DL
CHLORIDE SERPL-SCNC: 94 MMOL/L
CO2 SERPL-SCNC: 21 MMOL/L
CREAT SERPL-MCNC: 0.76 MG/DL
EGFR: 98 ML/MIN/1.73M2
EOSINOPHIL # BLD AUTO: 0.01 K/UL
EOSINOPHIL NFR BLD AUTO: 0.1 %
GLUCOSE SERPL-MCNC: 231 MG/DL
HCT VFR BLD CALC: 45 %
HGB BLD-MCNC: 15.2 G/DL
IMM GRANULOCYTES NFR BLD AUTO: 0.5 %
INR PPP: 0.89 RATIO
LYMPHOCYTES # BLD AUTO: 1.4 K/UL
LYMPHOCYTES NFR BLD AUTO: 9.4 %
MAN DIFF?: NORMAL
MCHC RBC-ENTMCNC: 28.4 PG
MCHC RBC-ENTMCNC: 33.8 GM/DL
MCV RBC AUTO: 84.1 FL
MONOCYTES # BLD AUTO: 0.76 K/UL
MONOCYTES NFR BLD AUTO: 5.1 %
NEUTROPHILS # BLD AUTO: 12.67 K/UL
NEUTROPHILS NFR BLD AUTO: 84.7 %
PLATELET # BLD AUTO: 325 K/UL
POTASSIUM SERPL-SCNC: 4.2 MMOL/L
PROT SERPL-MCNC: 7 G/DL
PT BLD: 10.1 SEC
RBC # BLD: 5.35 M/UL
RBC # FLD: 13.5 %
SODIUM SERPL-SCNC: 133 MMOL/L
WBC # FLD AUTO: 14.94 K/UL

## 2024-04-16 ENCOUNTER — RESULT REVIEW (OUTPATIENT)
Age: 69
End: 2024-04-16

## 2024-04-19 RX ORDER — ALPRAZOLAM 0.25 MG/1
0.25 TABLET ORAL
Qty: 15 | Refills: 0 | Status: ACTIVE | COMMUNITY
Start: 2024-04-19 | End: 1900-01-01

## 2024-04-24 ENCOUNTER — APPOINTMENT (OUTPATIENT)
Dept: HEMATOLOGY ONCOLOGY | Facility: CLINIC | Age: 69
End: 2024-04-24
Payer: MEDICARE

## 2024-04-24 ENCOUNTER — RESULT REVIEW (OUTPATIENT)
Age: 69
End: 2024-04-24

## 2024-04-24 VITALS
SYSTOLIC BLOOD PRESSURE: 118 MMHG | HEIGHT: 67 IN | OXYGEN SATURATION: 94 % | HEART RATE: 125 BPM | BODY MASS INDEX: 44.42 KG/M2 | WEIGHT: 283 LBS | TEMPERATURE: 98 F | DIASTOLIC BLOOD PRESSURE: 77 MMHG | RESPIRATION RATE: 16 BRPM

## 2024-04-24 DIAGNOSIS — Z51.11 ENCOUNTER FOR ANTINEOPLASTIC CHEMOTHERAPY: ICD-10-CM

## 2024-04-24 DIAGNOSIS — R00.0 TACHYCARDIA, UNSPECIFIED: ICD-10-CM

## 2024-04-24 PROCEDURE — 99214 OFFICE O/P EST MOD 30 MIN: CPT

## 2024-04-24 PROCEDURE — G2211 COMPLEX E/M VISIT ADD ON: CPT | Mod: NC,1L

## 2024-04-24 RX ORDER — AMLODIPINE BESYLATE 10 MG/1
10 TABLET ORAL
Refills: 0 | Status: DISCONTINUED | COMMUNITY
End: 2024-04-24

## 2024-04-24 NOTE — HISTORY OF PRESENT ILLNESS
[de-identified] : Mr. Jovi Martinez is 67 years old male with mediastinal lymphadenopathy here for consultation, referred by Dr. Liboiro Garcia.  Accompanied by his daughter  Patient with hx of diabetes, HTN, hyperlipidemia, penile neoplasm ( invasive SCC s/p partial penectomy 3/2023, inguinal dissection at [facility], recent Pet/CT scan with mediastinal LN.   5/30/2023 lung screening Mediastinum/hilum: Mediastinal lymphadenopathy including right lower paratracheal 3.0 x 1.3 cm lymph node (series 4 image 22) and 2.9 x 1.4 cm lymph node (series 4 image 28). Substernal extension left thyroid lobe. Pulmonary nodules: Right upper lobe 0.3 cm anterior subpleural nodule (series 3 image 112). Left lower lobe 0.3 cm nodule (series 3 image 122). Right middle lobe 0.2 cm possibly calcified nodule (series 3 image 135). Lung Rads Category :  2S  7/19/2023 Pet/CT  1.  Hypermetabolic mediastinal/hilar, celiac and right inguinal lymphadenopathy concerning for malignant involvement. This includes a 1.7 cm Station 1R/low cervical lymph node, SUV max 9.6. No focal uptake above background in remnant penile tissue. 2.  There is an appearance of mildly asymmetric uptake of the right rectal/distal sigmoid wall which may be due to misregistration and is difficult to separate from diffuse physiologic and metformin related uptake, SUV max 10.5. Correlation with recent colonoscopy results is recommended.  3.  Thin subcutaneous fluid in the region of the right anteromedial thigh status post lymph node dissection with mildly increased uptake, SUV max 2.8, likely inflammatory. 4.  Small left pleural effusion.  8/3/2023 A. LYMPH NODE, STATION 10L, EBUS, FNA: Negative for malignant cells  - Polymorphic lymphoid cells and bronchial cells, see comment. B. LYMPH NODE, STATION 7, EBUS, FNA: Negative for malignant cells  - Polymorphic lymphoid cells and bronchial cells, see comment. C. LYMPH NODE, STATION 4R, EBUS, FNA: Atypia  - Few non-necrotizing granuloma in a background of atypical bronchial cells and polymorphic lymphoid cells D. BRONCHIAL WASHING: Atypia - Atypical bronchial cells in a background of macrophages and chronic inflammatory cells, see comment. E. BAL: Atypia - Atypical bronchial cells in a background of macrophages and chronic inflammatory cells, see comment.  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 S/p hospitalization for groin biopsy complications/infection 12/16-12/17/23 Being Seen by Wound care, going to clinic 1x per week, VNS every other day  Overall feeling well Will be seeing Dr Lal for f/u in 6 weeks  - cleared to start chemo from Uro standpoint  Denies any fevers, chills, reports having normal regular BMs and normal urination without pain   He is s/p excisional biopsy of the right inguinal node with Dr Perez (11/30/23) Path: Metastatic squamous cell carcinoma    [de-identified] : Patient is here for C4 Carboplatin paclitaxel keytruda (2/16/24 - present)  His daughter Lucila cannot accompanied him today since her daughter is not feeling well.   Patient took 12 Decadron last night and forgot this am dosing Seen by cardiologist who d/c amlodipine and started him on Coreg bid.  He reports of smoking 5 cigars per day but not inhaling.

## 2024-04-24 NOTE — PHYSICAL EXAM
[Restricted in physically strenuous activity but ambulatory and able to carry out work of a light or sedentary nature] : Status 1- Restricted in physically strenuous activity but ambulatory and able to carry out work of a light or sedentary nature, e.g., light house work, office work [Obese] : obese [Normal] : affect appropriate [de-identified] : In the right groin wound very well-healed

## 2024-04-24 NOTE — ASSESSMENT
[Designated Health Care Proxy] : Designated Health Care Proxy [Name: ___] : Name: [unfilled] [Relationship: ___] : Relationship: [unfilled] [Full Code] : full code [FreeTextEntry1] : ## Metastatic squamous cell carcinoma PET/CT(7/19/23) hypermetabolic mediastinal, hilar, celiac and right inguinal lymphadenopathy. Most FDG avid lymph node: Right inguinal lymph node Likely mets from his penile ca vs less likely alternate primary such as lung given he is chronic smoker Status post bilateral inguinal node dissection with Dr. Ramirez on 6/1/2023-Path shows no lymph node identified. Fibroadipose tissue. Subsequently underwent EBUS, FNA which was negative for any malignant cells Chronic active smoker- has greatly cut down but still smokes here and there. s/p excisional biopsy of the right inguinal LN Path cw Squamous cell ca ALE PDL1 10-15%  Patient is here for C4D1 Carboplatin paclitaxel and keytruda (2/16/24 - present) - Carboplatin was not received on C2 due to hyperglycemia.  Patient overall tolerating treatment - schedule to see endo for hyperglycemia and cardiology for tachycardia.  referral to see dietician. Education on healthy eating stressed each time.  -He has been tolerating Paclitaxel well with Decadron 8 mg the night before and 4 mg the morning off - to continue with each treatment.  -Labs are drawn in the office, reviewed, analyzed, and discussed -Naproxen 500 mg given for generalized leg pain during chemo treatment.  continue with Zofran prn nausea proceed with treatment. To have new restaging scan prior to next treatment.   Plan: Carboplatin AUC 5 plus paclitaxel 150 mg/m plus Keytruda 200 mg every 3 weeks with Neulasta  Penile SCC Status post biopsy of plans (2/03): Invasive SCC s/p partial penectomy (4/21/23) - Path: pT3, G2, moderately differentiated SCC infiltrates corpus cavernosum  #Cardiomyopathy continue following up with cardiology   Patient and daughter (Lucila) had multiple questions which were answered to satisfaction  d/w Dr. Maria  Follow up in 3 weeks for C5 CBC, CMP, LDH, ESR, CRP  COntact: Lucila (Daughter) [phone].  [FreeTextEntry2] : Paramjit - [phone]

## 2024-04-25 ENCOUNTER — APPOINTMENT (OUTPATIENT)
Dept: UROLOGY | Facility: CLINIC | Age: 69
End: 2024-04-25

## 2024-05-08 ENCOUNTER — RESULT REVIEW (OUTPATIENT)
Age: 69
End: 2024-05-08

## 2024-05-15 ENCOUNTER — RESULT REVIEW (OUTPATIENT)
Age: 69
End: 2024-05-15

## 2024-05-15 ENCOUNTER — APPOINTMENT (OUTPATIENT)
Dept: HEMATOLOGY ONCOLOGY | Facility: CLINIC | Age: 69
End: 2024-05-15
Payer: MEDICARE

## 2024-05-15 VITALS
SYSTOLIC BLOOD PRESSURE: 125 MMHG | OXYGEN SATURATION: 93 % | RESPIRATION RATE: 16 BRPM | BODY MASS INDEX: 44.89 KG/M2 | HEIGHT: 67 IN | TEMPERATURE: 97.6 F | HEART RATE: 96 BPM | DIASTOLIC BLOOD PRESSURE: 75 MMHG | WEIGHT: 286 LBS

## 2024-05-15 DIAGNOSIS — Z51.11 ENCOUNTER FOR ANTINEOPLASTIC CHEMOTHERAPY: ICD-10-CM

## 2024-05-15 DIAGNOSIS — C77.9 SECONDARY AND UNSPECIFIED MALIGNANT NEOPLASM OF LYMPH NODE, UNSPECIFIED: ICD-10-CM

## 2024-05-15 DIAGNOSIS — C60.9 MALIGNANT NEOPLASM OF PENIS, UNSPECIFIED: ICD-10-CM

## 2024-05-15 PROCEDURE — 99214 OFFICE O/P EST MOD 30 MIN: CPT

## 2024-05-15 PROCEDURE — G2211 COMPLEX E/M VISIT ADD ON: CPT | Mod: NC,1L

## 2024-05-16 NOTE — REVIEW OF SYSTEMS
[Joint Pain] : joint pain [Negative] : Allergic/Immunologic [FreeTextEntry2] : 10 point review of systems negative except as outlined in HPI

## 2024-05-16 NOTE — PHYSICAL EXAM
[Restricted in physically strenuous activity but ambulatory and able to carry out work of a light or sedentary nature] : Status 1- Restricted in physically strenuous activity but ambulatory and able to carry out work of a light or sedentary nature, e.g., light house work, office work [Obese] : obese [Normal] : affect appropriate [de-identified] : In the right groin wound very well-healed

## 2024-05-23 RX ORDER — OXYCODONE AND ACETAMINOPHEN 5; 325 MG/1; MG/1
5-325 TABLET ORAL
Qty: 30 | Refills: 0 | Status: ACTIVE | COMMUNITY
Start: 2023-02-03 | End: 1900-01-01

## 2024-05-23 RX ORDER — DEXAMETHASONE 4 MG/1
4 TABLET ORAL
Qty: 6 | Refills: 5 | Status: ACTIVE | COMMUNITY
Start: 2024-02-01 | End: 1900-01-01

## 2024-05-30 NOTE — ASSESSMENT
[FreeTextEntry1] : ## Metastatic squamous cell carcinoma PET/CT(7/19/23) hypermetabolic mediastinal, hilar, celiac and right inguinal lymphadenopathy. Most FDG avid lymph node: Right inguinal lymph node Likely mets from his penile ca vs less likely alternate primary such as lung given he is chronic smoker Status post bilateral inguinal node dissection with Dr. Ramirez on 6/1/2023-Path shows no lymph node identified. Fibroadipose tissue. Subsequently underwent EBUS, FNA which was negative for any malignant cells Chronic active smoker- has greatly cut down but still smokes here and there. s/p excisional biopsy of the right inguinal LN Path cw Squamous cell ca ALE PDL1 10-15%  Patient is here for C5D1 Carboplatin paclitaxel and keytruda (2/16/24 - present) - Carboplatin was not received on C2 due to hyperglycemia.  His BP and HR is better controlled since cardiology adjusted his med to Coreg Reports of eating healthier with BS better controlled -Labs are drawn in the office, reviewed, analyzed, and discussed Overall tolerating treatment Take Decadron 12 mg the morning of treatment only and tolerating Paclitaxel with no issue Repeated Scans is still pending  continue with Zofran prn nasuea  Extensive discussion with daughter Lucila that patient can go to [facility] for a few weeks if he wishes and further treatment adjustment pending with CT result.   Plan: Carboplatin AUC 5 plus paclitaxel 150 mg/m plus Keytruda 200 mg every 3 weeks with Neulasta  Penile SCC Status post biopsy of plans (2/03): Invasive SCC s/p partial penectomy (4/21/23) - Path: pT3, G2, moderately differentiated SCC infiltrates corpus cavernosum  #Cardiomyopathy continue following up with cardiology  Patient and daughter (Lucila) had multiple questions which were answered to satisfaction  d/w Dr. Maria  Follow up in 3 weeks for C6 CBC, CMP, LDH, ESR, CRP  COntact: Lucila (Daughter) [phone]. ntro [Designated Health Care Proxy] : Designated Health Care Proxy [Name: ___] : Name: [unfilled] [Relationship: ___] : Relationship: [unfilled] [Full Code] : full code [FreeTextEntry2] : Paramjit - [phone]

## 2024-05-30 NOTE — PHYSICAL EXAM
[Restricted in physically strenuous activity but ambulatory and able to carry out work of a light or sedentary nature] : Status 1- Restricted in physically strenuous activity but ambulatory and able to carry out work of a light or sedentary nature, e.g., light house work, office work [Obese] : obese [Normal] : affect appropriate [de-identified] : In the right groin wound very well-healed

## 2024-05-30 NOTE — HISTORY OF PRESENT ILLNESS
[de-identified] : Mr. Jovi Martinez is 67 years old male with mediastinal lymphadenopathy here for consultation, referred by Dr. Liborio Garcia.  Accompanied by his daughter  Patient with hx of diabetes, HTN, hyperlipidemia, penile neoplasm ( invasive SCC s/p partial penectomy 3/2023, inguinal dissection at [facility], recent Pet/CT scan with mediastinal LN.   5/30/2023 lung screening Mediastinum/hilum: Mediastinal lymphadenopathy including right lower paratracheal 3.0 x 1.3 cm lymph node (series 4 image 22) and 2.9 x 1.4 cm lymph node (series 4 image 28). Substernal extension left thyroid lobe. Pulmonary nodules: Right upper lobe 0.3 cm anterior subpleural nodule (series 3 image 112). Left lower lobe 0.3 cm nodule (series 3 image 122). Right middle lobe 0.2 cm possibly calcified nodule (series 3 image 135). Lung Rads Category :  2S  7/19/2023 Pet/CT  1.  Hypermetabolic mediastinal/hilar, celiac and right inguinal lymphadenopathy concerning for malignant involvement. This includes a 1.7 cm Station 1R/low cervical lymph node, SUV max 9.6. No focal uptake above background in remnant penile tissue. 2.  There is an appearance of mildly asymmetric uptake of the right rectal/distal sigmoid wall which may be due to misregistration and is difficult to separate from diffuse physiologic and metformin related uptake, SUV max 10.5. Correlation with recent colonoscopy results is recommended.  3.  Thin subcutaneous fluid in the region of the right anteromedial thigh status post lymph node dissection with mildly increased uptake, SUV max 2.8, likely inflammatory. 4.  Small left pleural effusion.  8/3/2023 A. LYMPH NODE, STATION 10L, EBUS, FNA: Negative for malignant cells  - Polymorphic lymphoid cells and bronchial cells, see comment. B. LYMPH NODE, STATION 7, EBUS, FNA: Negative for malignant cells  - Polymorphic lymphoid cells and bronchial cells, see comment. C. LYMPH NODE, STATION 4R, EBUS, FNA: Atypia  - Few non-necrotizing granuloma in a background of atypical bronchial cells and polymorphic lymphoid cells D. BRONCHIAL WASHING: Atypia - Atypical bronchial cells in a background of macrophages and chronic inflammatory cells, see comment. E. BAL: Atypia - Atypical bronchial cells in a background of macrophages and chronic inflammatory cells, see comment.  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 S/p hospitalization for groin biopsy complications/infection 12/16-12/17/23 Being Seen by Wound care, going to clinic 1x per week, VNS every other day  Overall feeling well Will be seeing Dr Lal for f/u in 6 weeks  - cleared to start chemo from Uro standpoint  Denies any fevers, chills, reports having normal regular BMs and normal urination without pain   He is s/p excisional biopsy of the right inguinal node with Dr Perez (11/30/23) Path: Metastatic squamous cell carcinoma    [de-identified] : Patient is here for C5 Carboplatin paclitaxel keytruda (2/16/24 - present)  Accompanied by his daughter Lucila   His arthritic pain worse after treatment x 2-3 days  Took Decadron 12 mg only this morning  He reports of smoking but not inhaling.  Scans were done a week ago  He's wishing to go away to go to [facility] for 4-6 weeks after C6

## 2024-05-30 NOTE — REVIEW OF SYSTEMS
[Joint Pain] : joint pain [FreeTextEntry2] : 10 point review of systems negative except as outlined in HPI

## 2024-06-05 ENCOUNTER — APPOINTMENT (OUTPATIENT)
Dept: HEMATOLOGY ONCOLOGY | Facility: CLINIC | Age: 69
End: 2024-06-05

## 2024-06-07 ENCOUNTER — NON-APPOINTMENT (OUTPATIENT)
Age: 69
End: 2024-06-07

## 2024-06-20 ENCOUNTER — APPOINTMENT (OUTPATIENT)
Dept: HEART AND VASCULAR | Facility: CLINIC | Age: 69
End: 2024-06-20

## 2024-06-26 ENCOUNTER — APPOINTMENT (OUTPATIENT)
Dept: HEMATOLOGY ONCOLOGY | Facility: CLINIC | Age: 69
End: 2024-06-26

## 2024-07-12 ENCOUNTER — APPOINTMENT (OUTPATIENT)
Dept: ENDOCRINOLOGY | Facility: CLINIC | Age: 69
End: 2024-07-12

## 2024-07-26 ENCOUNTER — APPOINTMENT (OUTPATIENT)
Dept: CARDIOLOGY | Facility: CLINIC | Age: 69
End: 2024-07-26

## 2024-07-26 NOTE — ASSESSMENT
[FreeTextEntry1] : ## Metastatic squamous cell carcinoma PET/CT(7/19/23) hypermetabolic mediastinal, hilar, celiac and right inguinal lymphadenopathy. Most FDG avid lymph node: Right inguinal lymph node Likely mets from his penile ca vs less likely alternate primary such as lung given he is chronic smoker Status post bilateral inguinal node dissection with Dr. Ramirez on 6/1/2023-Path shows no lymph node identified. Fibroadipose tissue. Subsequently underwent EBUS, FNA which was negative for any malignant cells Chronic active smoker- has greatly cut down but still smokes here and there. s/p excisional biopsy of the right inguinal LN Path cw Squamous cell ca ALE PDL1 10-15%  Patient is here for C5D1 Carboplatin paclitaxel and keytruda (2/16/24 - present) - Carboplatin was not received on C2 due to hyperglycemia.  His BP and HR is better controlled since cardiology adjusted his med to Coreg Reports of eating healthier with BS better controlled -Labs are drawn in the office, reviewed, analyzed, and discussed Overall tolerating treatment Take Decadron 12 mg the morning of treatment only and tolerating Paclitaxel with no issue Repeated Scans is still pending  continue with Zofran prn nasuea  Extensive discussion with daughter Lucila that patient can go to [facility] for a few weeks if he wishes and further treatment adjustment pending with CT result.   Plan: Carboplatin AUC 5 plus paclitaxel 150 mg/m plus Keytruda 200 mg every 3 weeks with Neulasta  Penile SCC Status post biopsy of plans (2/03): Invasive SCC s/p partial penectomy (4/21/23) - Path: pT3, G2, moderately differentiated SCC infiltrates corpus cavernosum  #Cardiomyopathy continue following up with cardiology  Patient and daughter (Lucila) had multiple questions which were answered to satisfaction  d/w Dr. Maria  Follow up in 3 weeks for C6 CBC, CMP, LDH, ESR, CRP  COntact: Lucila (Daughter) [phone]. ntro [Designated Health Care Proxy] : Designated Health Care Proxy [Name: ___] : Name: [unfilled] [Relationship: ___] : Relationship: [unfilled] [Full Code] : full code [FreeTextEntry2] : Paramjit - [phone]

## 2024-07-26 NOTE — HISTORY OF PRESENT ILLNESS
[de-identified] : Mr. Jovi Martinez is 67 years old male with mediastinal lymphadenopathy here for consultation, referred by Dr. Liborio Garcia.  Accompanied by his daughter  Patient with hx of diabetes, HTN, hyperlipidemia, penile neoplasm ( invasive SCC s/p partial penectomy 3/2023, inguinal dissection at [facility], recent Pet/CT scan with mediastinal LN.   5/30/2023 lung screening Mediastinum/hilum: Mediastinal lymphadenopathy including right lower paratracheal 3.0 x 1.3 cm lymph node (series 4 image 22) and 2.9 x 1.4 cm lymph node (series 4 image 28). Substernal extension left thyroid lobe. Pulmonary nodules: Right upper lobe 0.3 cm anterior subpleural nodule (series 3 image 112). Left lower lobe 0.3 cm nodule (series 3 image 122). Right middle lobe 0.2 cm possibly calcified nodule (series 3 image 135). Lung Rads Category :  2S  7/19/2023 Pet/CT  1.  Hypermetabolic mediastinal/hilar, celiac and right inguinal lymphadenopathy concerning for malignant involvement. This includes a 1.7 cm Station 1R/low cervical lymph node, SUV max 9.6. No focal uptake above background in remnant penile tissue. 2.  There is an appearance of mildly asymmetric uptake of the right rectal/distal sigmoid wall which may be due to misregistration and is difficult to separate from diffuse physiologic and metformin related uptake, SUV max 10.5. Correlation with recent colonoscopy results is recommended.  3.  Thin subcutaneous fluid in the region of the right anteromedial thigh status post lymph node dissection with mildly increased uptake, SUV max 2.8, likely inflammatory. 4.  Small left pleural effusion.  8/3/2023 A. LYMPH NODE, STATION 10L, EBUS, FNA: Negative for malignant cells  - Polymorphic lymphoid cells and bronchial cells, see comment. B. LYMPH NODE, STATION 7, EBUS, FNA: Negative for malignant cells  - Polymorphic lymphoid cells and bronchial cells, see comment. C. LYMPH NODE, STATION 4R, EBUS, FNA: Atypia  - Few non-necrotizing granuloma in a background of atypical bronchial cells and polymorphic lymphoid cells D. BRONCHIAL WASHING: Atypia - Atypical bronchial cells in a background of macrophages and chronic inflammatory cells, see comment. E. BAL: Atypia - Atypical bronchial cells in a background of macrophages and chronic inflammatory cells, see comment.  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 S/p hospitalization for groin biopsy complications/infection 12/16-12/17/23 Being Seen by Wound care, going to clinic 1x per week, VNS every other day  Overall feeling well Will be seeing Dr Lal for f/u in 6 weeks  - cleared to start chemo from Uro standpoint  Denies any fevers, chills, reports having normal regular BMs and normal urination without pain   He is s/p excisional biopsy of the right inguinal node with Dr Perez (11/30/23) Path: Metastatic squamous cell carcinoma    [de-identified] : Patient is here for C5 Carboplatin paclitaxel keytruda (2/16/24 - present)  Accompanied by his daughter Lucila   His arthritic pain worse after treatment x 2-3 days  Took Decadron 12 mg only this morning  He reports of smoking but not inhaling.  Scans were done a week ago  He's wishing to go away to go to [facility] for 4-6 weeks after C6

## 2024-07-26 NOTE — PHYSICAL EXAM
[Restricted in physically strenuous activity but ambulatory and able to carry out work of a light or sedentary nature] : Status 1- Restricted in physically strenuous activity but ambulatory and able to carry out work of a light or sedentary nature, e.g., light house work, office work [Obese] : obese [Normal] : affect appropriate [de-identified] : In the right groin wound very well-healed

## 2024-07-31 ENCOUNTER — NON-APPOINTMENT (OUTPATIENT)
Age: 69
End: 2024-07-31

## 2024-08-06 ENCOUNTER — APPOINTMENT (OUTPATIENT)
Dept: HEMATOLOGY ONCOLOGY | Facility: CLINIC | Age: 69
End: 2024-08-06

## 2024-08-15 ENCOUNTER — APPOINTMENT (OUTPATIENT)
Dept: HEMATOLOGY ONCOLOGY | Facility: CLINIC | Age: 69
End: 2024-08-15
Payer: MEDICARE

## 2024-08-15 ENCOUNTER — NON-APPOINTMENT (OUTPATIENT)
Age: 69
End: 2024-08-15

## 2024-08-15 ENCOUNTER — RESULT REVIEW (OUTPATIENT)
Age: 69
End: 2024-08-15

## 2024-08-15 VITALS
SYSTOLIC BLOOD PRESSURE: 107 MMHG | HEIGHT: 67 IN | WEIGHT: 271.56 LBS | RESPIRATION RATE: 17 BRPM | DIASTOLIC BLOOD PRESSURE: 70 MMHG | BODY MASS INDEX: 42.62 KG/M2 | TEMPERATURE: 97.4 F | OXYGEN SATURATION: 95 % | HEART RATE: 98 BPM

## 2024-08-15 DIAGNOSIS — Z51.11 ENCOUNTER FOR ANTINEOPLASTIC CHEMOTHERAPY: ICD-10-CM

## 2024-08-15 DIAGNOSIS — F17.210 NICOTINE DEPENDENCE, CIGARETTES, UNCOMPLICATED: ICD-10-CM

## 2024-08-15 DIAGNOSIS — C77.9 SECONDARY AND UNSPECIFIED MALIGNANT NEOPLASM OF LYMPH NODE, UNSPECIFIED: ICD-10-CM

## 2024-08-15 DIAGNOSIS — Z79.899 OTHER LONG TERM (CURRENT) DRUG THERAPY: ICD-10-CM

## 2024-08-15 DIAGNOSIS — C60.9 MALIGNANT NEOPLASM OF PENIS, UNSPECIFIED: ICD-10-CM

## 2024-08-15 PROCEDURE — G2211 COMPLEX E/M VISIT ADD ON: CPT | Mod: NC

## 2024-08-15 PROCEDURE — 99215 OFFICE O/P EST HI 40 MIN: CPT

## 2024-08-15 RX ORDER — SACUBITRIL AND VALSARTAN 49; 51 MG/1; MG/1
TABLET, FILM COATED ORAL
Refills: 0 | Status: ACTIVE | COMMUNITY

## 2024-08-15 RX ORDER — FUROSEMIDE 40 MG/1
40 TABLET ORAL
Refills: 0 | Status: ACTIVE | COMMUNITY

## 2024-08-15 RX ORDER — EMPAGLIFLOZIN 25 MG/1
TABLET, FILM COATED ORAL
Refills: 0 | Status: ACTIVE | COMMUNITY

## 2024-08-15 RX ORDER — ALBUTEROL 90 MCG
AEROSOL (GRAM) INHALATION
Refills: 0 | Status: ACTIVE | COMMUNITY

## 2024-08-15 RX ORDER — GUAIFENESIN AND DEXTROMETHORPHAN 20; 200 MG/10ML; MG/10ML
SYRUP ORAL
Refills: 0 | Status: ACTIVE | COMMUNITY

## 2024-08-15 RX ORDER — METOPROLOL SUCCINATE 200 MG/1
TABLET, EXTENDED RELEASE ORAL
Refills: 0 | Status: ACTIVE | COMMUNITY

## 2024-08-15 NOTE — ASSESSMENT
[FreeTextEntry1] : ## Metastatic squamous cell carcinoma PET/CT(7/19/23) hypermetabolic mediastinal, hilar, celiac and right inguinal lymphadenopathy. Most FDG avid lymph node: Right inguinal lymph node Likely mets from his penile ca vs less likely alternate primary such as lung given he is chronic smoker Status post bilateral inguinal node dissection with Dr. Ramirez on 6/1/2023-Path shows no lymph node identified. Fibroadipose tissue. Subsequently underwent EBUS, FNA which was negative for any malignant cells Chronic active smoker- has greatly cut down but still smokes here and there. s/p excisional biopsy of the right inguinal LN Path cw Squamous cell ca ALE PDL1 10-15% s/p C5D1 Carboplatin paclitaxel and keytruda (2/16/24 - 5/15/24).  Scans showed POD with mild worsening mediastinal and bilateral hilar lymphadenopathy and recurrence of right inguinal lymphadenopathy Opted to visit [facility] 1 month before starting 2nd line treatment. [place] to [place] delayed due to missing flights and subsequent admission at [facility] for CHF exacerbation (admits to stopping lasix before the episode) He is here to begin Cetuximab #1 (8/15/24) Reports that the leg swelling is much better Following with his cardiologist Labs ordered, drawn in the office, reviewed, analyzed and discussed Discussed with the patient and his daughter about the lack of evidence-based subsequent therapeutic options.  Plan is to treat him with single agent cetuximab.  Risks benefits and side effects are reviewed analyzed and discussed.  Side effects including rash, hypomagnesemia, constipation, dyspepsia, nausea, mouth sores, vomiting, abnormal LFTs, infection, anxiety, confusion, depression, fatigue, headache, pain, neuropathy, joint pains, shortness of breath, infusion reaction reviewed and discussed with the patient and family.  Multiple questions answered to satisfaction.  Drug information printed and given to the patient.  He agrees to get cetuximab.  He prefers to do biweekly dose instead of weekly Given the time lag since the last scan, recommend repeat CT C/A/P to establish new baseline Plan: Cetuximab 500 mg/m2 every 2 weeks  Penile SCC Status post biopsy of plans (2/03): Invasive SCC s/p partial penectomy (4/21/23) - Path: pT3, G2, moderately differentiated SCC infiltrates corpus cavernosum  #Cardiomyopathy continue following up with cardiology  Patient and daughter (Lucila) had multiple questions which were answered to satisfaction  Follow-up in 2 weeks for cetuximab #2 CBC, CMP, magnesium  COntact: Lucila (Daughter) [phone]. ntro [FreeTextEntry2] : Paramjit - [phone]

## 2024-08-15 NOTE — HISTORY OF PRESENT ILLNESS
[de-identified] : Mr. Jovi Martinez is 67 years old male with mediastinal lymphadenopathy here for consultation, referred by Dr. Liborio Garcia.  Accompanied by his daughter  Patient with hx of diabetes, HTN, hyperlipidemia, penile neoplasm ( invasive SCC s/p partial penectomy 3/2023, inguinal dissection at [facility], recent Pet/CT scan with mediastinal LN.   5/30/2023 lung screening Mediastinum/hilum: Mediastinal lymphadenopathy including right lower paratracheal 3.0 x 1.3 cm lymph node (series 4 image 22) and 2.9 x 1.4 cm lymph node (series 4 image 28). Substernal extension left thyroid lobe. Pulmonary nodules: Right upper lobe 0.3 cm anterior subpleural nodule (series 3 image 112). Left lower lobe 0.3 cm nodule (series 3 image 122). Right middle lobe 0.2 cm possibly calcified nodule (series 3 image 135). Lung Rads Category :  2S  7/19/2023 Pet/CT  1.  Hypermetabolic mediastinal/hilar, celiac and right inguinal lymphadenopathy concerning for malignant involvement. This includes a 1.7 cm Station 1R/low cervical lymph node, SUV max 9.6. No focal uptake above background in remnant penile tissue. 2.  There is an appearance of mildly asymmetric uptake of the right rectal/distal sigmoid wall which may be due to misregistration and is difficult to separate from diffuse physiologic and metformin related uptake, SUV max 10.5. Correlation with recent colonoscopy results is recommended.  3.  Thin subcutaneous fluid in the region of the right anteromedial thigh status post lymph node dissection with mildly increased uptake, SUV max 2.8, likely inflammatory. 4.  Small left pleural effusion.  8/3/2023 A. LYMPH NODE, STATION 10L, EBUS, FNA: Negative for malignant cells  - Polymorphic lymphoid cells and bronchial cells, see comment. B. LYMPH NODE, STATION 7, EBUS, FNA: Negative for malignant cells  - Polymorphic lymphoid cells and bronchial cells, see comment. C. LYMPH NODE, STATION 4R, EBUS, FNA: Atypia  - Few non-necrotizing granuloma in a background of atypical bronchial cells and polymorphic lymphoid cells D. BRONCHIAL WASHING: Atypia - Atypical bronchial cells in a background of macrophages and chronic inflammatory cells, see comment. E. BAL: Atypia - Atypical bronchial cells in a background of macrophages and chronic inflammatory cells, see comment.  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 S/p hospitalization for groin biopsy complications/infection 12/16-12/17/23 Being Seen by Wound care, going to clinic 1x per week, VNS every other day  Overall feeling well Will be seeing Dr Lal for f/u in 6 weeks  - cleared to start chemo from Uro standpoint  Denies any fevers, chills, reports having normal regular BMs and normal urination without pain   He is s/p excisional biopsy of the right inguinal node with Dr Perez (11/30/23) Path: Metastatic squamous cell carcinoma    [de-identified] : He is seen today for follow up and begin Cetuximab #1 (8/15/24) Accompanied by his daughter (Lucila)  He went to [facility] and was supposed to return July 2024, but missed his flight twice.   Upon return, he was admitted at [facility] for heart failure (SOB). Patient had a thoracentesis and is now on furosemide 40mg.  Patient saw his PCP yesterday and ordered labs and stated labs were clear. He has since lost 10lbs since his recent admission at [facility]. Pt went to [facility] from 6/8/24 and came back 8/2/24. Pt otherwise feels "great."  He admits to stopping lasix before the event

## 2024-08-22 ENCOUNTER — LABORATORY RESULT (OUTPATIENT)
Age: 69
End: 2024-08-22

## 2024-08-22 ENCOUNTER — APPOINTMENT (OUTPATIENT)
Dept: HEART AND VASCULAR | Facility: CLINIC | Age: 69
End: 2024-08-22
Payer: MEDICARE

## 2024-08-22 ENCOUNTER — NON-APPOINTMENT (OUTPATIENT)
Age: 69
End: 2024-08-22

## 2024-08-22 VITALS
WEIGHT: 275 LBS | HEART RATE: 89 BPM | SYSTOLIC BLOOD PRESSURE: 110 MMHG | HEIGHT: 67 IN | OXYGEN SATURATION: 98 % | DIASTOLIC BLOOD PRESSURE: 80 MMHG | BODY MASS INDEX: 43.16 KG/M2

## 2024-08-22 DIAGNOSIS — I50.20 UNSPECIFIED SYSTOLIC (CONGESTIVE) HEART FAILURE: ICD-10-CM

## 2024-08-22 PROCEDURE — 99214 OFFICE O/P EST MOD 30 MIN: CPT | Mod: 25

## 2024-08-22 PROCEDURE — 93000 ELECTROCARDIOGRAM COMPLETE: CPT

## 2024-08-23 LAB
CHOLEST SERPL-MCNC: 159 MG/DL
HDLC SERPL-MCNC: 34 MG/DL
LDLC SERPL CALC-MCNC: 87 MG/DL
NONHDLC SERPL-MCNC: 125 MG/DL
TRIGL SERPL-MCNC: 225 MG/DL

## 2024-08-23 NOTE — HISTORY OF PRESENT ILLNESS
[FreeTextEntry1] : 67 YO M with HTN, penile cancer S/P surgery with mets to LN on chemo now, with a old inf/anterior MI on EKG and EF: 38% who has no CP/SOB/CASAS/palpitations/syncope. Patient had been drinking/smoking when he was young but not now.  EKG is unchanged.   8/22/24 Patient's status post a left heart cath which showed severe proximal LAD disease with severe calcification and short segment  of the mid LAD, large OM with no significant disease, moderate diffuse disease of the RCA and severe RPDA distal disease.  Patient is not having any chest pain palpitations/syncope.  Patient has had some dyspnea on exertion and edema for which she is taking Lasix.  Patient does not know all of his medications and does not have the list with him now.  EKG with inferior and anterior Q waves.  Unchanged.

## 2024-08-23 NOTE — DISCUSSION/SUMMARY
[EKG obtained to assist in diagnosis and management of assessed problem(s)] : EKG obtained to assist in diagnosis and management of assessed problem(s) [FreeTextEntry1] : 69 YO M with HTN, penile cancer S/P surgery with mets to LN on chemo now, with a old inf/anterior MI on EKG and EF: 38% who has no CP/SOB/CASAS/palpitations/syncope. Patient had been drinking/smoking when he was young but not now.  EKG is unchanged.   Cardiomyopathy Patient will bring the medications for the next visit.  ACE inh/statin for now and will try to switch to entresto   Coreg 12.5 BID  Continue Lasix.  F/U with heart failure in the future and decide regarding ICD once optimized.  ASA 81mg daily  Spoke to patient regarding the coronary disease. There is apical akinesis on echo and the rest of the segments are hypokinetic. Will do a cardiac MRI for viability.  Patient has a very poor prognosis for the cancer of 6-12 months as per hem/onc

## 2024-08-23 NOTE — HISTORY OF PRESENT ILLNESS
[FreeTextEntry1] : 69 YO M with HTN, penile cancer S/P surgery with mets to LN on chemo now, with a old inf/anterior MI on EKG and EF: 38% who has no CP/SOB/CASAS/palpitations/syncope. Patient had been drinking/smoking when he was young but not now.  EKG is unchanged.   8/22/24 Patient's status post a left heart cath which showed severe proximal LAD disease with severe calcification and short segment  of the mid LAD, large OM with no significant disease, moderate diffuse disease of the RCA and severe RPDA distal disease.  Patient is not having any chest pain palpitations/syncope.  Patient has had some dyspnea on exertion and edema for which she is taking Lasix.  Patient does not know all of his medications and does not have the list with him now.  EKG with inferior and anterior Q waves.  Unchanged.

## 2024-08-23 NOTE — DISCUSSION/SUMMARY
[EKG obtained to assist in diagnosis and management of assessed problem(s)] : EKG obtained to assist in diagnosis and management of assessed problem(s) [FreeTextEntry1] : 67 YO M with HTN, penile cancer S/P surgery with mets to LN on chemo now, with a old inf/anterior MI on EKG and EF: 38% who has no CP/SOB/CASAS/palpitations/syncope. Patient had been drinking/smoking when he was young but not now.  EKG is unchanged.   Cardiomyopathy Patient will bring the medications for the next visit.  ACE inh/statin for now and will try to switch to entresto   Coreg 12.5 BID  Continue Lasix.  F/U with heart failure in the future and decide regarding ICD once optimized.  ASA 81mg daily  Spoke to patient regarding the coronary disease. There is apical akinesis on echo and the rest of the segments are hypokinetic. Will do a cardiac MRI for viability.  Patient has a very poor prognosis for the cancer of 6-12 months as per hem/onc

## 2024-08-29 ENCOUNTER — RESULT REVIEW (OUTPATIENT)
Age: 69
End: 2024-08-29

## 2024-08-29 ENCOUNTER — APPOINTMENT (OUTPATIENT)
Dept: HEMATOLOGY ONCOLOGY | Facility: CLINIC | Age: 69
End: 2024-08-29
Payer: MEDICARE

## 2024-08-29 VITALS
TEMPERATURE: 97.6 F | DIASTOLIC BLOOD PRESSURE: 64 MMHG | BODY MASS INDEX: 42.28 KG/M2 | WEIGHT: 269.38 LBS | HEIGHT: 67 IN | SYSTOLIC BLOOD PRESSURE: 110 MMHG | RESPIRATION RATE: 15 BRPM | HEART RATE: 104 BPM | OXYGEN SATURATION: 96 %

## 2024-08-29 DIAGNOSIS — C77.9 SECONDARY AND UNSPECIFIED MALIGNANT NEOPLASM OF LYMPH NODE, UNSPECIFIED: ICD-10-CM

## 2024-08-29 DIAGNOSIS — Z51.11 ENCOUNTER FOR ANTINEOPLASTIC CHEMOTHERAPY: ICD-10-CM

## 2024-08-29 DIAGNOSIS — R21 RASH AND OTHER NONSPECIFIC SKIN ERUPTION: ICD-10-CM

## 2024-08-29 PROCEDURE — G2211 COMPLEX E/M VISIT ADD ON: CPT | Mod: NC

## 2024-08-29 PROCEDURE — 99214 OFFICE O/P EST MOD 30 MIN: CPT

## 2024-08-29 NOTE — HISTORY OF PRESENT ILLNESS
[de-identified] : Mr. Jovi Martinez is 67 years old male with mediastinal lymphadenopathy here for consultation, referred by Dr. Liborio Garcia.  Accompanied by his daughter  Patient with hx of diabetes, HTN, hyperlipidemia, penile neoplasm ( invasive SCC s/p partial penectomy 3/2023, inguinal dissection at [facility], recent Pet/CT scan with mediastinal LN.   5/30/2023 lung screening Mediastinum/hilum: Mediastinal lymphadenopathy including right lower paratracheal 3.0 x 1.3 cm lymph node (series 4 image 22) and 2.9 x 1.4 cm lymph node (series 4 image 28). Substernal extension left thyroid lobe. Pulmonary nodules: Right upper lobe 0.3 cm anterior subpleural nodule (series 3 image 112). Left lower lobe 0.3 cm nodule (series 3 image 122). Right middle lobe 0.2 cm possibly calcified nodule (series 3 image 135). Lung Rads Category :  2S  7/19/2023 Pet/CT  1.  Hypermetabolic mediastinal/hilar, celiac and right inguinal lymphadenopathy concerning for malignant involvement. This includes a 1.7 cm Station 1R/low cervical lymph node, SUV max 9.6. No focal uptake above background in remnant penile tissue. 2.  There is an appearance of mildly asymmetric uptake of the right rectal/distal sigmoid wall which may be due to misregistration and is difficult to separate from diffuse physiologic and metformin related uptake, SUV max 10.5. Correlation with recent colonoscopy results is recommended.  3.  Thin subcutaneous fluid in the region of the right anteromedial thigh status post lymph node dissection with mildly increased uptake, SUV max 2.8, likely inflammatory. 4.  Small left pleural effusion.  8/3/2023 A. LYMPH NODE, STATION 10L, EBUS, FNA: Negative for malignant cells  - Polymorphic lymphoid cells and bronchial cells, see comment. B. LYMPH NODE, STATION 7, EBUS, FNA: Negative for malignant cells  - Polymorphic lymphoid cells and bronchial cells, see comment. C. LYMPH NODE, STATION 4R, EBUS, FNA: Atypia  - Few non-necrotizing granuloma in a background of atypical bronchial cells and polymorphic lymphoid cells D. BRONCHIAL WASHING: Atypia - Atypical bronchial cells in a background of macrophages and chronic inflammatory cells, see comment. E. BAL: Atypia - Atypical bronchial cells in a background of macrophages and chronic inflammatory cells, see comment.  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 S/p hospitalization for groin biopsy complications/infection 12/16-12/17/23 Being Seen by Wound care, going to clinic 1x per week, VNS every other day  Overall feeling well Will be seeing Dr Lal for f/u in 6 weeks  - cleared to start chemo from Uro standpoint  Denies any fevers, chills, reports having normal regular BMs and normal urination without pain   He is s/p excisional biopsy of the right inguinal node with Dr Perez (11/30/23) Path: Metastatic squamous cell carcinoma    [de-identified] : He is seen today for follow up and begin Cetuximab #2 (8/15/24) - present) [facility]  [place] # [id number]  B/L lower ext edema improved since taking Furosemide 40 mg bid, denies SOB at rest. Follows by cardiology Dr. Leon He has mild pruritic rash on upper ext only, improved with topical cream (can't recall name).   Weight - 286--> 275--> 269 lbs

## 2024-08-29 NOTE — ASSESSMENT
[Designated Health Care Proxy] : Designated Health Care Proxy [Name: ___] : Name: [unfilled] [Relationship: ___] : Relationship: [unfilled] [Full Code] : full code [FreeTextEntry1] : ## Metastatic squamous cell carcinoma PET/CT(7/19/23) hypermetabolic mediastinal, hilar, celiac and right inguinal lymphadenopathy. Most FDG avid lymph node: Right inguinal lymph node Likely mets from his penile ca vs less likely alternate primary such as lung given he is chronic smoker Status post bilateral inguinal node dissection with Dr. Ramirez on 6/1/2023-Path shows no lymph node identified. Fibroadipose tissue. Subsequently underwent EBUS, FNA which was negative for any malignant cells Chronic active smoker- has greatly cut down but still smokes here and there. s/p excisional biopsy of the right inguinal LN Path cw Squamous cell ca ALE PDL1 10-15% s/p C5D1 Carboplatin paclitaxel and keytruda (2/16/24 - 5/15/24).  5/2024 Scans showed POD with mild worsening mediastinal and bilateral hilar lymphadenopathy and recurrence of right inguinal lymphadenopathy Opted to visit [facility] 1 month before starting 2nd line treatment. [place] to [place] delayed due to missing flights and subsequent admission at [facility] for CHF exacerbation (admits to stopping lasix before the episode)  He is here to begin Cetuximab #2 (8/15/24 - present) He's overall doing better - to continue with Furosemide 40 mg bid for edema and to follow up with cardiology -Explained that his rash is from Erbitux and to continue with cream. Skin care with limited hot water and to use Aveeno cream/soap/moisturizers - to reach out to us if rash worsen.  -Labs are drawn in the office, reviewed, analyzed, and discussed -Will schedule for patient to get new baseline CT scan since it's been 3 months since his last scan.  proceed with treatment.   Plan: Cetuximab 500 mg/m2 every 2 weeks  Penile SCC Status post biopsy of plans (2/03): Invasive SCC s/p partial penectomy (4/21/23) - Path: pT3, G2, moderately differentiated SCC infiltrates corpus cavernosum  #Cardiomyopathy continue following up with cardiology  Patient and daughter (Lucila) had multiple questions which were answered to satisfaction  d/w Dr. Maria  Follow-up in 2 weeks for cetuximab #3 CBC, CMP, magnesium  COntact: Lucila (Daughter) [phone]. ntro [FreeTextEntry2] : Paramjit - [phone]

## 2024-08-29 NOTE — HISTORY OF PRESENT ILLNESS
[de-identified] : Mr. Jovi Martinez is 67 years old male with mediastinal lymphadenopathy here for consultation, referred by Dr. Liborio Garcia.  Accompanied by his daughter  Patient with hx of diabetes, HTN, hyperlipidemia, penile neoplasm ( invasive SCC s/p partial penectomy 3/2023, inguinal dissection at [facility], recent Pet/CT scan with mediastinal LN.   5/30/2023 lung screening Mediastinum/hilum: Mediastinal lymphadenopathy including right lower paratracheal 3.0 x 1.3 cm lymph node (series 4 image 22) and 2.9 x 1.4 cm lymph node (series 4 image 28). Substernal extension left thyroid lobe. Pulmonary nodules: Right upper lobe 0.3 cm anterior subpleural nodule (series 3 image 112). Left lower lobe 0.3 cm nodule (series 3 image 122). Right middle lobe 0.2 cm possibly calcified nodule (series 3 image 135). Lung Rads Category :  2S  7/19/2023 Pet/CT  1.  Hypermetabolic mediastinal/hilar, celiac and right inguinal lymphadenopathy concerning for malignant involvement. This includes a 1.7 cm Station 1R/low cervical lymph node, SUV max 9.6. No focal uptake above background in remnant penile tissue. 2.  There is an appearance of mildly asymmetric uptake of the right rectal/distal sigmoid wall which may be due to misregistration and is difficult to separate from diffuse physiologic and metformin related uptake, SUV max 10.5. Correlation with recent colonoscopy results is recommended.  3.  Thin subcutaneous fluid in the region of the right anteromedial thigh status post lymph node dissection with mildly increased uptake, SUV max 2.8, likely inflammatory. 4.  Small left pleural effusion.  8/3/2023 A. LYMPH NODE, STATION 10L, EBUS, FNA: Negative for malignant cells  - Polymorphic lymphoid cells and bronchial cells, see comment. B. LYMPH NODE, STATION 7, EBUS, FNA: Negative for malignant cells  - Polymorphic lymphoid cells and bronchial cells, see comment. C. LYMPH NODE, STATION 4R, EBUS, FNA: Atypia  - Few non-necrotizing granuloma in a background of atypical bronchial cells and polymorphic lymphoid cells D. BRONCHIAL WASHING: Atypia - Atypical bronchial cells in a background of macrophages and chronic inflammatory cells, see comment. E. BAL: Atypia - Atypical bronchial cells in a background of macrophages and chronic inflammatory cells, see comment.  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 S/p hospitalization for groin biopsy complications/infection 12/16-12/17/23 Being Seen by Wound care, going to clinic 1x per week, VNS every other day  Overall feeling well Will be seeing Dr Lal for f/u in 6 weeks  - cleared to start chemo from Uro standpoint  Denies any fevers, chills, reports having normal regular BMs and normal urination without pain   He is s/p excisional biopsy of the right inguinal node with Dr Perez (11/30/23) Path: Metastatic squamous cell carcinoma    [de-identified] : He is seen today for follow up and begin Cetuximab #2 (8/15/24) - present) [facility]  [place] # [id number]  B/L lower ext edema improved since taking Furosemide 40 mg bid, denies SOB at rest. Follows by cardiology Dr. Leon He has mild pruritic rash on upper ext only, improved with topical cream (can't recall name).   Weight - 286--> 275--> 269 lbs

## 2024-08-29 NOTE — ASSESSMENT
[Designated Health Care Proxy] : Designated Health Care Proxy [Name: ___] : Name: [unfilled] [Relationship: ___] : Relationship: [unfilled] [Full Code] : full code [FreeTextEntry1] : ## Metastatic squamous cell carcinoma PET/CT(7/19/23) hypermetabolic mediastinal, hilar, celiac and right inguinal lymphadenopathy. Most FDG avid lymph node: Right inguinal lymph node Likely mets from his penile ca vs less likely alternate primary such as lung given he is chronic smoker Status post bilateral inguinal node dissection with Dr. Ramirez on 6/1/2023-Path shows no lymph node identified. Fibroadipose tissue. Subsequently underwent EBUS, FNA which was negative for any malignant cells Chronic active smoker- has greatly cut down but still smokes here and there. s/p excisional biopsy of the right inguinal LN Path cw Squamous cell ca ALE PDL1 10-15% s/p C5D1 Carboplatin paclitaxel and keytruda (2/16/24 - 5/15/24).  5/2024 Scans showed POD with mild worsening mediastinal and bilateral hilar lymphadenopathy and recurrence of right inguinal lymphadenopathy Opted to visit [facility] 1 month before starting 2nd line treatment. [place] and subsequent admission at [facility] for CHF exacerbation (admits to stopping lasix before the episode)  He is here to begin Cetuximab #2 (8/15/24 - present) He's overall doing better - to continue with Furosemide 40 mg bid for edema and to follow up with cardiology -Explained that his rash is from Erbitux and to continue with cream. Skin care with limited hot water and to use Aveeno cream/soap/moisturizers - to reach out to us if rash worsen.  -Labs are drawn in the office, reviewed, analyzed, and discussed -Will schedule for patient to get new baseline CT scan since it's been 3 months since his last scan.  proceed with treatment.   Plan: Cetuximab 500 mg/m2 every 2 weeks  Penile SCC Status post biopsy of plans (2/03): Invasive SCC s/p partial penectomy (4/21/23) - Path: pT3, G2, moderately differentiated SCC infiltrates corpus cavernosum  #Cardiomyopathy continue following up with cardiology  Patient and daughter (Lucila) had multiple questions which were answered to satisfaction  d/w Dr. Maria  Follow-up in 2 weeks for cetuximab #3 CBC, CMP, magnesium  COntact: Lucila (Daughter) [phone]. ntro [FreeTextEntry2] : Paramjit - [phone]

## 2024-08-29 NOTE — PHYSICAL EXAM
[Restricted in physically strenuous activity but ambulatory and able to carry out work of a light or sedentary nature] : Status 1- Restricted in physically strenuous activity but ambulatory and able to carry out work of a light or sedentary nature, e.g., light house work, office work [Obese] : obese [de-identified] : In the right groin wound very well-healed  [Normal] : normoactive bowel sounds, soft and nontender, no hepatosplenomegaly or masses appreciated [de-identified] : generalized rash on upper ext.

## 2024-08-29 NOTE — HISTORY OF PRESENT ILLNESS
[de-identified] : Mr. Jovi Martinez is 67 years old male with mediastinal lymphadenopathy here for consultation, referred by Dr. Liborio Garcia.  Accompanied by his daughter  Patient with hx of diabetes, HTN, hyperlipidemia, penile neoplasm ( invasive SCC s/p partial penectomy 3/2023, inguinal dissection at [facility], recent Pet/CT scan with mediastinal LN.   5/30/2023 lung screening Mediastinum/hilum: Mediastinal lymphadenopathy including right lower paratracheal 3.0 x 1.3 cm lymph node (series 4 image 22) and 2.9 x 1.4 cm lymph node (series 4 image 28). Substernal extension left thyroid lobe. Pulmonary nodules: Right upper lobe 0.3 cm anterior subpleural nodule (series 3 image 112). Left lower lobe 0.3 cm nodule (series 3 image 122). Right middle lobe 0.2 cm possibly calcified nodule (series 3 image 135). Lung Rads Category :  2S  7/19/2023 Pet/CT  1.  Hypermetabolic mediastinal/hilar, celiac and right inguinal lymphadenopathy concerning for malignant involvement. This includes a 1.7 cm Station 1R/low cervical lymph node, SUV max 9.6. No focal uptake above background in remnant penile tissue. 2.  There is an appearance of mildly asymmetric uptake of the right rectal/distal sigmoid wall which may be due to misregistration and is difficult to separate from diffuse physiologic and metformin related uptake, SUV max 10.5. Correlation with recent colonoscopy results is recommended.  3.  Thin subcutaneous fluid in the region of the right anteromedial thigh status post lymph node dissection with mildly increased uptake, SUV max 2.8, likely inflammatory. 4.  Small left pleural effusion.  8/3/2023 A. LYMPH NODE, STATION 10L, EBUS, FNA: Negative for malignant cells  - Polymorphic lymphoid cells and bronchial cells, see comment. B. LYMPH NODE, STATION 7, EBUS, FNA: Negative for malignant cells  - Polymorphic lymphoid cells and bronchial cells, see comment. C. LYMPH NODE, STATION 4R, EBUS, FNA: Atypia  - Few non-necrotizing granuloma in a background of atypical bronchial cells and polymorphic lymphoid cells D. BRONCHIAL WASHING: Atypia - Atypical bronchial cells in a background of macrophages and chronic inflammatory cells, see comment. E. BAL: Atypia - Atypical bronchial cells in a background of macrophages and chronic inflammatory cells, see comment.  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 S/p hospitalization for groin biopsy complications/infection 12/16-12/17/23 Being Seen by Wound care, going to clinic 1x per week, VNS every other day  Overall feeling well Will be seeing Dr Lal for f/u in 6 weeks  - cleared to start chemo from Uro standpoint  Denies any fevers, chills, reports having normal regular BMs and normal urination without pain   He is s/p excisional biopsy of the right inguinal node with Dr Perez (11/30/23) Path: Metastatic squamous cell carcinoma    [de-identified] : He is seen today for follow up and begin Cetuximab #2 (8/15/24) - present) [facility]  [place] # [id number]  B/L lower ext edema improved since taking Furosemide 40 mg bid, denies SOB at rest. Follows by cardiology Dr. Leon He has mild pruritic rash on upper ext only, improved with topical cream (can't recall name).   Weight - 286--> 275--> 269 lbs

## 2024-08-29 NOTE — PHYSICAL EXAM
[Restricted in physically strenuous activity but ambulatory and able to carry out work of a light or sedentary nature] : Status 1- Restricted in physically strenuous activity but ambulatory and able to carry out work of a light or sedentary nature, e.g., light house work, office work [Obese] : obese [de-identified] : In the right groin wound very well-healed  [Normal] : normoactive bowel sounds, soft and nontender, no hepatosplenomegaly or masses appreciated [de-identified] : generalized rash on upper ext.

## 2024-08-29 NOTE — ASSESSMENT
[Designated Health Care Proxy] : Designated Health Care Proxy [Name: ___] : Name: [unfilled] [Relationship: ___] : Relationship: [unfilled] [Full Code] : full code [FreeTextEntry1] : ## Metastatic squamous cell carcinoma PET/CT(7/19/23) hypermetabolic mediastinal, hilar, celiac and right inguinal lymphadenopathy. Most FDG avid lymph node: Right inguinal lymph node Likely mets from his penile ca vs less likely alternate primary such as lung given he is chronic smoker Status post bilateral inguinal node dissection with Dr. Ramirez on 6/1/2023-Path shows no lymph node identified. Fibroadipose tissue. Subsequently underwent EBUS, FNA which was negative for any malignant cells Chronic active smoker- has greatly cut down but still smokes here and there. s/p excisional biopsy of the right inguinal LN Path cw Squamous cell ca ALE PDL1 10-15% s/p C5D1 Carboplatin paclitaxel and keytruda (2/16/24 - 5/15/24).  5/2024 Scans showed POD with mild worsening mediastinal and bilateral hilar lymphadenopathy and recurrence of right inguinal lymphadenopathy Opted to visit [facility] 1 month before starting 2nd line treatment. [place] due [place] and subsequent admission at [facility] for CHF exacerbation (admits to stopping lasix before the episode)  He is here to begin Cetuximab #2 (8/15/24 - present) He's overall doing better - to continue with Furosemide 40 mg bid for edema and to follow up with cardiology -Explained that his rash is from Erbitux and to continue with cream. Skin care with limited hot water and to use Aveeno cream/soap/moisturizers - to reach out to us if rash worsen.  -Labs are drawn in the office, reviewed, analyzed, and discussed -Will schedule for patient to get new baseline CT scan since it's been 3 months since his last scan.  proceed with treatment.   Plan: Cetuximab 500 mg/m2 every 2 weeks  Penile SCC Status post biopsy of plans (2/03): Invasive SCC s/p partial penectomy (4/21/23) - Path: pT3, G2, moderately differentiated SCC infiltrates corpus cavernosum  #Cardiomyopathy continue following up with cardiology  Patient and daughter (Lucila) had multiple questions which were answered to satisfaction  d/w Dr. Maria  Follow-up in 2 weeks for cetuximab #3 CBC, CMP, magnesium  COntact: Lucila (Daughter) [phone]. ntro [FreeTextEntry2] : Paramjit - [phone]

## 2024-09-10 ENCOUNTER — NON-APPOINTMENT (OUTPATIENT)
Age: 69
End: 2024-09-10

## 2024-09-10 ENCOUNTER — RESULT REVIEW (OUTPATIENT)
Age: 69
End: 2024-09-10

## 2024-09-12 ENCOUNTER — RESULT REVIEW (OUTPATIENT)
Age: 69
End: 2024-09-12

## 2024-09-12 ENCOUNTER — APPOINTMENT (OUTPATIENT)
Dept: HEMATOLOGY ONCOLOGY | Facility: CLINIC | Age: 69
End: 2024-09-12
Payer: MEDICARE

## 2024-09-12 VITALS
BODY MASS INDEX: 42.22 KG/M2 | HEART RATE: 101 BPM | RESPIRATION RATE: 16 BRPM | WEIGHT: 269 LBS | HEIGHT: 67 IN | DIASTOLIC BLOOD PRESSURE: 71 MMHG | SYSTOLIC BLOOD PRESSURE: 112 MMHG | TEMPERATURE: 97.8 F | OXYGEN SATURATION: 96 %

## 2024-09-12 DIAGNOSIS — C77.9 SECONDARY AND UNSPECIFIED MALIGNANT NEOPLASM OF LYMPH NODE, UNSPECIFIED: ICD-10-CM

## 2024-09-12 DIAGNOSIS — Z51.11 ENCOUNTER FOR ANTINEOPLASTIC CHEMOTHERAPY: ICD-10-CM

## 2024-09-12 PROCEDURE — 99214 OFFICE O/P EST MOD 30 MIN: CPT

## 2024-09-12 PROCEDURE — G2211 COMPLEX E/M VISIT ADD ON: CPT | Mod: NC

## 2024-09-12 NOTE — ASSESSMENT
[Designated Health Care Proxy] : Designated Health Care Proxy [Name: ___] : Name: [unfilled] [Relationship: ___] : Relationship: [unfilled] [Full Code] : full code [FreeTextEntry1] : ## Metastatic squamous cell carcinoma PET/CT(7/19/23) hypermetabolic mediastinal, hilar, celiac and right inguinal lymphadenopathy. Most FDG avid lymph node: Right inguinal lymph node Likely mets from his penile ca vs less likely alternate primary such as lung given he is chronic smoker Status post bilateral inguinal node dissection with Dr. Ramirez on 6/1/2023-Path shows no lymph node identified. Fibroadipose tissue. Subsequently underwent EBUS, FNA which was negative for any malignant cells Chronic active smoker- has greatly cut down but still smokes here and there. s/p excisional biopsy of the right inguinal LN Path cw Squamous cell ca ALE PDL1 10-15% s/p C5D1 Carboplatin paclitaxel and keytruda (2/16/24 - 5/15/24).  5/2024 Scans showed POD with mild worsening mediastinal and bilateral hilar lymphadenopathy and recurrence of right inguinal lymphadenopathy Opted to visit [facility] 1 month before starting 2nd line treatment. [place] and subsequent admission at [facility] for CHF exacerbation (admits to stopping lasix before the episode)  He is here to begin Cetuximab #3 (8/15/24 - present) Other than Cetuximab induced acne-form rash on face and upper ext, he's doing well. Advised on using mild soap and moisturizers like Aveeno/Udder/Cetaphil. To take Triamcinolone only as needed.  -Labs are drawn in the office, reviewed, analyzed, and discussed new baseline CT (9/10/2024) - still pending  - will call back in a few days to go over result.  proceed with treatment  Plan: Cetuximab 500 mg/m2 every 2 weeks  Penile SCC Status post biopsy of plans (2/03): Invasive SCC s/p partial penectomy (4/21/23) - Path: pT3, G2, moderately differentiated SCC infiltrates corpus cavernosum  #Cardiomyopathy continue following up with cardiology  Patient and daughter (Lucila) had multiple questions which were answered to satisfaction  d/w Dr. Maria  Follow-up in 2 weeks for cetuximab #4 CBC, CMP, magnesium  COntact: Lucila (Daughter) [phone]. ntro [FreeTextEntry2] : Paramjit - [phone]

## 2024-09-12 NOTE — BEGINNING OF VISIT
[0] : 2) Feeling down, depressed, or hopeless: Not at all (0) [PHQ-2 Negative] : PHQ-2 Negative [Pain Scale: ___] : On a scale of 1-10, today the patient's pain is a(n) [unfilled]. [Former] : Former [0-4] : 0-4 [Patient advised of risk of tobacco use and smoking cessation discussed.] : Patient advised of risk of tobacco use and smoking cessation discussed. [Date Discussed (MM/DD/YY): ___] : Discussed: [unfilled] [With Patient/Caregiver] : with Patient/Caregiver [Abdominal Pain] : no abdominal pain [Vomiting] : no vomiting [Constipation] : no constipation [de-identified] : smoked until 3 months ago

## 2024-09-12 NOTE — HISTORY OF PRESENT ILLNESS
[de-identified] : Mr. Jovi Martinez is 67 years old male with mediastinal lymphadenopathy here for consultation, referred by Dr. Liborio Garcia.  Accompanied by his daughter  Patient with hx of diabetes, HTN, hyperlipidemia, penile neoplasm ( invasive SCC s/p partial penectomy 3/2023, inguinal dissection at [facility], recent Pet/CT scan with mediastinal LN.   5/30/2023 lung screening Mediastinum/hilum: Mediastinal lymphadenopathy including right lower paratracheal 3.0 x 1.3 cm lymph node (series 4 image 22) and 2.9 x 1.4 cm lymph node (series 4 image 28). Substernal extension left thyroid lobe. Pulmonary nodules: Right upper lobe 0.3 cm anterior subpleural nodule (series 3 image 112). Left lower lobe 0.3 cm nodule (series 3 image 122). Right middle lobe 0.2 cm possibly calcified nodule (series 3 image 135). Lung Rads Category :  2S  7/19/2023 Pet/CT  1.  Hypermetabolic mediastinal/hilar, celiac and right inguinal lymphadenopathy concerning for malignant involvement. This includes a 1.7 cm Station 1R/low cervical lymph node, SUV max 9.6. No focal uptake above background in remnant penile tissue. 2.  There is an appearance of mildly asymmetric uptake of the right rectal/distal sigmoid wall which may be due to misregistration and is difficult to separate from diffuse physiologic and metformin related uptake, SUV max 10.5. Correlation with recent colonoscopy results is recommended.  3.  Thin subcutaneous fluid in the region of the right anteromedial thigh status post lymph node dissection with mildly increased uptake, SUV max 2.8, likely inflammatory. 4.  Small left pleural effusion.  8/3/2023 A. LYMPH NODE, STATION 10L, EBUS, FNA: Negative for malignant cells  - Polymorphic lymphoid cells and bronchial cells, see comment. B. LYMPH NODE, STATION 7, EBUS, FNA: Negative for malignant cells  - Polymorphic lymphoid cells and bronchial cells, see comment. C. LYMPH NODE, STATION 4R, EBUS, FNA: Atypia  - Few non-necrotizing granuloma in a background of atypical bronchial cells and polymorphic lymphoid cells D. BRONCHIAL WASHING: Atypia - Atypical bronchial cells in a background of macrophages and chronic inflammatory cells, see comment. E. BAL: Atypia - Atypical bronchial cells in a background of macrophages and chronic inflammatory cells, see comment.  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 S/p hospitalization for groin biopsy complications/infection 12/16-12/17/23 Being Seen by Wound care, going to clinic 1x per week, VNS every other day  Overall feeling well Will be seeing Dr Lal for f/u in 6 weeks  - cleared to start chemo from Uro standpoint  Denies any fevers, chills, reports having normal regular BMs and normal urination without pain   He is s/p excisional biopsy of the right inguinal node with Dr Perez (11/30/23) Path: Metastatic squamous cell carcinoma    [de-identified] : He is seen today for follow up and begin Cetuximab #3 (8/15/24) - present) Here with his daughter Jasmina  He has more acne-form rash on face and ext with treatment, seen by Dr. Flores and prescribed Triamcinolone.  His BS is better controlled -  earlier this morning.  he's still taking Furosemide 40 mg bid as per recommended by his cardiologist for fluid retention.   Weight - 286--> 275--> 269 lbs

## 2024-09-12 NOTE — BEGINNING OF VISIT
[0] : 2) Feeling down, depressed, or hopeless: Not at all (0) [PHQ-2 Negative] : PHQ-2 Negative [Pain Scale: ___] : On a scale of 1-10, today the patient's pain is a(n) [unfilled]. [Former] : Former [0-4] : 0-4 [Patient advised of risk of tobacco use and smoking cessation discussed.] : Patient advised of risk of tobacco use and smoking cessation discussed. [Date Discussed (MM/DD/YY): ___] : Discussed: [unfilled] [With Patient/Caregiver] : with Patient/Caregiver [Abdominal Pain] : no abdominal pain [Vomiting] : no vomiting [Constipation] : no constipation [de-identified] : smoked until 3 months ago

## 2024-09-12 NOTE — HISTORY OF PRESENT ILLNESS
[de-identified] : Mr. Jovi Martinez is 67 years old male with mediastinal lymphadenopathy here for consultation, referred by Dr. Liborio Garcia.  Accompanied by his daughter  Patient with hx of diabetes, HTN, hyperlipidemia, penile neoplasm ( invasive SCC s/p partial penectomy 3/2023, inguinal dissection at [facility], recent Pet/CT scan with mediastinal LN.   5/30/2023 lung screening Mediastinum/hilum: Mediastinal lymphadenopathy including right lower paratracheal 3.0 x 1.3 cm lymph node (series 4 image 22) and 2.9 x 1.4 cm lymph node (series 4 image 28). Substernal extension left thyroid lobe. Pulmonary nodules: Right upper lobe 0.3 cm anterior subpleural nodule (series 3 image 112). Left lower lobe 0.3 cm nodule (series 3 image 122). Right middle lobe 0.2 cm possibly calcified nodule (series 3 image 135). Lung Rads Category :  2S  7/19/2023 Pet/CT  1.  Hypermetabolic mediastinal/hilar, celiac and right inguinal lymphadenopathy concerning for malignant involvement. This includes a 1.7 cm Station 1R/low cervical lymph node, SUV max 9.6. No focal uptake above background in remnant penile tissue. 2.  There is an appearance of mildly asymmetric uptake of the right rectal/distal sigmoid wall which may be due to misregistration and is difficult to separate from diffuse physiologic and metformin related uptake, SUV max 10.5. Correlation with recent colonoscopy results is recommended.  3.  Thin subcutaneous fluid in the region of the right anteromedial thigh status post lymph node dissection with mildly increased uptake, SUV max 2.8, likely inflammatory. 4.  Small left pleural effusion.  8/3/2023 A. LYMPH NODE, STATION 10L, EBUS, FNA: Negative for malignant cells  - Polymorphic lymphoid cells and bronchial cells, see comment. B. LYMPH NODE, STATION 7, EBUS, FNA: Negative for malignant cells  - Polymorphic lymphoid cells and bronchial cells, see comment. C. LYMPH NODE, STATION 4R, EBUS, FNA: Atypia  - Few non-necrotizing granuloma in a background of atypical bronchial cells and polymorphic lymphoid cells D. BRONCHIAL WASHING: Atypia - Atypical bronchial cells in a background of macrophages and chronic inflammatory cells, see comment. E. BAL: Atypia - Atypical bronchial cells in a background of macrophages and chronic inflammatory cells, see comment.  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 S/p hospitalization for groin biopsy complications/infection 12/16-12/17/23 Being Seen by Wound care, going to clinic 1x per week, VNS every other day  Overall feeling well Will be seeing Dr Lal for f/u in 6 weeks  - cleared to start chemo from Uro standpoint  Denies any fevers, chills, reports having normal regular BMs and normal urination without pain   He is s/p excisional biopsy of the right inguinal node with Dr Perez (11/30/23) Path: Metastatic squamous cell carcinoma    [de-identified] : He is seen today for follow up and begin Cetuximab #3 (8/15/24) - present) Here with his daughter Jasmina  He has more acne-form rash on face and ext with treatment, seen by Dr. Flores and prescribed Triamcinolone.  His BS is better controlled -  earlier this morning.  he's still taking Furosemide 40 mg bid as per recommended by his cardiologist for fluid retention.   Weight - 286--> 275--> 269 lbs

## 2024-09-12 NOTE — PHYSICAL EXAM
[Restricted in physically strenuous activity but ambulatory and able to carry out work of a light or sedentary nature] : Status 1- Restricted in physically strenuous activity but ambulatory and able to carry out work of a light or sedentary nature, e.g., light house work, office work [Obese] : obese [Normal] : affect appropriate [de-identified] : acme-form on face and ext.

## 2024-09-12 NOTE — PHYSICAL EXAM
[Restricted in physically strenuous activity but ambulatory and able to carry out work of a light or sedentary nature] : Status 1- Restricted in physically strenuous activity but ambulatory and able to carry out work of a light or sedentary nature, e.g., light house work, office work [Obese] : obese [Normal] : affect appropriate [de-identified] : acme-form on face and ext.

## 2024-09-12 NOTE — PHYSICAL EXAM
[Restricted in physically strenuous activity but ambulatory and able to carry out work of a light or sedentary nature] : Status 1- Restricted in physically strenuous activity but ambulatory and able to carry out work of a light or sedentary nature, e.g., light house work, office work [Obese] : obese [Normal] : affect appropriate [de-identified] : acme-form on face and ext.

## 2024-09-12 NOTE — ASSESSMENT
[Designated Health Care Proxy] : Designated Health Care Proxy [Name: ___] : Name: [unfilled] [Relationship: ___] : Relationship: [unfilled] [Full Code] : full code [FreeTextEntry1] : ## Metastatic squamous cell carcinoma PET/CT(7/19/23) hypermetabolic mediastinal, hilar, celiac and right inguinal lymphadenopathy. Most FDG avid lymph node: Right inguinal lymph node Likely mets from his penile ca vs less likely alternate primary such as lung given he is chronic smoker Status post bilateral inguinal node dissection with Dr. Ramirez on 6/1/2023-Path shows no lymph node identified. Fibroadipose tissue. Subsequently underwent EBUS, FNA which was negative for any malignant cells Chronic active smoker- has greatly cut down but still smokes here and there. s/p excisional biopsy of the right inguinal LN Path cw Squamous cell ca ALE PDL1 10-15% s/p C5D1 Carboplatin paclitaxel and keytruda (2/16/24 - 5/15/24).  5/2024 Scans showed POD with mild worsening mediastinal and bilateral hilar lymphadenopathy and recurrence of right inguinal lymphadenopathy Opted to visit [facility] 1 month before starting 2nd line treatment. [place] due [place] and subsequent admission at [facility] for CHF exacerbation (admits to stopping lasix before the episode)  He is here to begin Cetuximab #3 (8/15/24 - present) Other than Cetuximab induced acne-form rash on face and upper ext, he's doing well. Advised on using mild soap and moisturizers like Aveeno/Udder/Cetaphil. To take Triamcinolone only as needed.  -Labs are drawn in the office, reviewed, analyzed, and discussed new baseline CT (9/10/2024) - still pending  - will call back in a few days to go over result.  proceed with treatment  Plan: Cetuximab 500 mg/m2 every 2 weeks  Penile SCC Status post biopsy of plans (2/03): Invasive SCC s/p partial penectomy (4/21/23) - Path: pT3, G2, moderately differentiated SCC infiltrates corpus cavernosum  #Cardiomyopathy continue following up with cardiology  Patient and daughter (Lucila) had multiple questions which were answered to satisfaction  d/w Dr. Maria  Follow-up in 2 weeks for cetuximab #4 CBC, CMP, magnesium  COntact: Lucila (Daughter) [phone]. ntro [FreeTextEntry2] : Paramjit - [phone]

## 2024-09-12 NOTE — BEGINNING OF VISIT
[0] : 2) Feeling down, depressed, or hopeless: Not at all (0) [PHQ-2 Negative] : PHQ-2 Negative [Pain Scale: ___] : On a scale of 1-10, today the patient's pain is a(n) [unfilled]. [Former] : Former [0-4] : 0-4 [Patient advised of risk of tobacco use and smoking cessation discussed.] : Patient advised of risk of tobacco use and smoking cessation discussed. [Date Discussed (MM/DD/YY): ___] : Discussed: [unfilled] [With Patient/Caregiver] : with Patient/Caregiver [Abdominal Pain] : no abdominal pain [Vomiting] : no vomiting [Constipation] : no constipation [de-identified] : smoked until 3 months ago

## 2024-09-12 NOTE — ASSESSMENT
[Designated Health Care Proxy] : Designated Health Care Proxy [Name: ___] : Name: [unfilled] [Relationship: ___] : Relationship: [unfilled] [Full Code] : full code [FreeTextEntry1] : ## Metastatic squamous cell carcinoma PET/CT(7/19/23) hypermetabolic mediastinal, hilar, celiac and right inguinal lymphadenopathy. Most FDG avid lymph node: Right inguinal lymph node Likely mets from his penile ca vs less likely alternate primary such as lung given he is chronic smoker Status post bilateral inguinal node dissection with Dr. Ramirez on 6/1/2023-Path shows no lymph node identified. Fibroadipose tissue. Subsequently underwent EBUS, FNA which was negative for any malignant cells Chronic active smoker- has greatly cut down but still smokes here and there. s/p excisional biopsy of the right inguinal LN Path cw Squamous cell ca ALE PDL1 10-15% s/p C5D1 Carboplatin paclitaxel and keytruda (2/16/24 - 5/15/24).  5/2024 Scans showed POD with mild worsening mediastinal and bilateral hilar lymphadenopathy and recurrence of right inguinal lymphadenopathy Opted to visit [facility] 1 month before starting 2nd line treatment. [place] due [place] and subsequent admission at [facility] for CHF exacerbation (admits to stopping lasix before the episode)  He is here to begin Cetuximab #3 (8/15/24 - present) Other than Cetuximab induced acne-form rash on face and upper ext, he's doing well. Advised on using mild soap and moisturizers like Aveeno/Udder/Cetaphil. To take Triamcinolone only as needed.  -Labs are drawn in the office, reviewed, analyzed, and discussed new baseline CT (9/10/2024) - still pending  - will call back in a few days to go over result.  proceed with treatment  Plan: Cetuximab 500 mg/m2 every 2 weeks  Penile SCC Status post biopsy of plans (2/03): Invasive SCC s/p partial penectomy (4/21/23) - Path: pT3, G2, moderately differentiated SCC infiltrates corpus cavernosum  #Cardiomyopathy continue following up with cardiology  Patient and daughter (Lucila) had multiple questions which were answered to satisfaction  d/w Dr. Maria  Follow-up in 2 weeks for cetuximab #4 CBC, CMP, magnesium  COntact: Lucila (Daughter) [phone]. ntro [FreeTextEntry2] : Parajmit - [phone]

## 2024-09-12 NOTE — HISTORY OF PRESENT ILLNESS
[de-identified] : Mr. Jovi Martinez is 67 years old male with mediastinal lymphadenopathy here for consultation, referred by Dr. Liborio Garcia.  Accompanied by his daughter  Patient with hx of diabetes, HTN, hyperlipidemia, penile neoplasm ( invasive SCC s/p partial penectomy 3/2023, inguinal dissection at [facility], recent Pet/CT scan with mediastinal LN.   5/30/2023 lung screening Mediastinum/hilum: Mediastinal lymphadenopathy including right lower paratracheal 3.0 x 1.3 cm lymph node (series 4 image 22) and 2.9 x 1.4 cm lymph node (series 4 image 28). Substernal extension left thyroid lobe. Pulmonary nodules: Right upper lobe 0.3 cm anterior subpleural nodule (series 3 image 112). Left lower lobe 0.3 cm nodule (series 3 image 122). Right middle lobe 0.2 cm possibly calcified nodule (series 3 image 135). Lung Rads Category :  2S  7/19/2023 Pet/CT  1.  Hypermetabolic mediastinal/hilar, celiac and right inguinal lymphadenopathy concerning for malignant involvement. This includes a 1.7 cm Station 1R/low cervical lymph node, SUV max 9.6. No focal uptake above background in remnant penile tissue. 2.  There is an appearance of mildly asymmetric uptake of the right rectal/distal sigmoid wall which may be due to misregistration and is difficult to separate from diffuse physiologic and metformin related uptake, SUV max 10.5. Correlation with recent colonoscopy results is recommended.  3.  Thin subcutaneous fluid in the region of the right anteromedial thigh status post lymph node dissection with mildly increased uptake, SUV max 2.8, likely inflammatory. 4.  Small left pleural effusion.  8/3/2023 A. LYMPH NODE, STATION 10L, EBUS, FNA: Negative for malignant cells  - Polymorphic lymphoid cells and bronchial cells, see comment. B. LYMPH NODE, STATION 7, EBUS, FNA: Negative for malignant cells  - Polymorphic lymphoid cells and bronchial cells, see comment. C. LYMPH NODE, STATION 4R, EBUS, FNA: Atypia  - Few non-necrotizing granuloma in a background of atypical bronchial cells and polymorphic lymphoid cells D. BRONCHIAL WASHING: Atypia - Atypical bronchial cells in a background of macrophages and chronic inflammatory cells, see comment. E. BAL: Atypia - Atypical bronchial cells in a background of macrophages and chronic inflammatory cells, see comment.  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 S/p hospitalization for groin biopsy complications/infection 12/16-12/17/23 Being Seen by Wound care, going to clinic 1x per week, VNS every other day  Overall feeling well Will be seeing Dr Lal for f/u in 6 weeks  - cleared to start chemo from Uro standpoint  Denies any fevers, chills, reports having normal regular BMs and normal urination without pain   He is s/p excisional biopsy of the right inguinal node with Dr Perez (11/30/23) Path: Metastatic squamous cell carcinoma    [de-identified] : He is seen today for follow up and begin Cetuximab #3 (8/15/24) - present) Here with his daughter Jasmina  He has more acne-form rash on face and ext with treatment, seen by Dr. Flores and prescribed Triamcinolone.  His BS is better controlled -  earlier this morning.  he's still taking Furosemide 40 mg bid as per recommended by his cardiologist for fluid retention.   Weight - 286--> 275--> 269 lbs

## 2024-09-19 ENCOUNTER — APPOINTMENT (OUTPATIENT)
Dept: HEART AND VASCULAR | Facility: CLINIC | Age: 69
End: 2024-09-19

## 2024-09-23 NOTE — PROGRESS NOTE ADULT - ASSESSMENT
68 yo male s/p bilateral LN dissection, POD #1
66yo male with PMH of DM, HTN, HLD, penile cancer s/p bilateral lymph node dissection POD#0
Yes

## 2024-09-26 ENCOUNTER — RESULT REVIEW (OUTPATIENT)
Age: 69
End: 2024-09-26

## 2024-09-26 ENCOUNTER — APPOINTMENT (OUTPATIENT)
Dept: HEMATOLOGY ONCOLOGY | Facility: CLINIC | Age: 69
End: 2024-09-26
Payer: MEDICARE

## 2024-09-26 VITALS
HEIGHT: 67 IN | DIASTOLIC BLOOD PRESSURE: 76 MMHG | OXYGEN SATURATION: 96 % | SYSTOLIC BLOOD PRESSURE: 130 MMHG | WEIGHT: 271.25 LBS | HEART RATE: 86 BPM | BODY MASS INDEX: 42.57 KG/M2 | RESPIRATION RATE: 16 BRPM | TEMPERATURE: 97.3 F

## 2024-09-26 DIAGNOSIS — Z51.11 ENCOUNTER FOR ANTINEOPLASTIC CHEMOTHERAPY: ICD-10-CM

## 2024-09-26 DIAGNOSIS — C77.9 SECONDARY AND UNSPECIFIED MALIGNANT NEOPLASM OF LYMPH NODE, UNSPECIFIED: ICD-10-CM

## 2024-09-26 DIAGNOSIS — R59.0 LOCALIZED ENLARGED LYMPH NODES: ICD-10-CM

## 2024-09-26 DIAGNOSIS — R21 RASH AND OTHER NONSPECIFIC SKIN ERUPTION: ICD-10-CM

## 2024-09-26 PROCEDURE — 99214 OFFICE O/P EST MOD 30 MIN: CPT

## 2024-09-26 NOTE — HISTORY OF PRESENT ILLNESS
[de-identified] : Mr. Jovi Martinez is 67 years old male with mediastinal lymphadenopathy here for consultation, referred by Dr. Liborio Garcia.  Accompanied by his daughter  Patient with hx of diabetes, HTN, hyperlipidemia, penile neoplasm ( invasive SCC s/p partial penectomy 3/2023, inguinal dissection at [facility], recent Pet/CT scan with mediastinal LN.   5/30/2023 lung screening Mediastinum/hilum: Mediastinal lymphadenopathy including right lower paratracheal 3.0 x 1.3 cm lymph node (series 4 image 22) and 2.9 x 1.4 cm lymph node (series 4 image 28). Substernal extension left thyroid lobe. Pulmonary nodules: Right upper lobe 0.3 cm anterior subpleural nodule (series 3 image 112). Left lower lobe 0.3 cm nodule (series 3 image 122). Right middle lobe 0.2 cm possibly calcified nodule (series 3 image 135). Lung Rads Category :  2S  7/19/2023 Pet/CT  1.  Hypermetabolic mediastinal/hilar, celiac and right inguinal lymphadenopathy concerning for malignant involvement. This includes a 1.7 cm Station 1R/low cervical lymph node, SUV max 9.6. No focal uptake above background in remnant penile tissue. 2.  There is an appearance of mildly asymmetric uptake of the right rectal/distal sigmoid wall which may be due to misregistration and is difficult to separate from diffuse physiologic and metformin related uptake, SUV max 10.5. Correlation with recent colonoscopy results is recommended.  3.  Thin subcutaneous fluid in the region of the right anteromedial thigh status post lymph node dissection with mildly increased uptake, SUV max 2.8, likely inflammatory. 4.  Small left pleural effusion.  8/3/2023 A. LYMPH NODE, STATION 10L, EBUS, FNA: Negative for malignant cells  - Polymorphic lymphoid cells and bronchial cells, see comment. B. LYMPH NODE, STATION 7, EBUS, FNA: Negative for malignant cells  - Polymorphic lymphoid cells and bronchial cells, see comment. C. LYMPH NODE, STATION 4R, EBUS, FNA: Atypia  - Few non-necrotizing granuloma in a background of atypical bronchial cells and polymorphic lymphoid cells D. BRONCHIAL WASHING: Atypia - Atypical bronchial cells in a background of macrophages and chronic inflammatory cells, see comment. E. BAL: Atypia - Atypical bronchial cells in a background of macrophages and chronic inflammatory cells, see comment.  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 S/p hospitalization for groin biopsy complications/infection 12/16-12/17/23 Being Seen by Wound care, going to clinic 1x per week, VNS every other day  Overall feeling well Will be seeing Dr Lal for f/u in 6 weeks  - cleared to start chemo from Uro standpoint  Denies any fevers, chills, reports having normal regular BMs and normal urination without pain   He is s/p excisional biopsy of the right inguinal node with Dr Perez (11/30/23) Path: Metastatic squamous cell carcinoma    [de-identified] : He is seen today for follow up and begin Cetuximab #4 (8/15/24) - present) Here with his daughter Jasmina over the phone  patient is tolerating this treatment better, energy improved and generalized rash improved with Triamcinolone prn.  he's still taking Furosemide 40 mg bid as per recommended by his cardiologist for fluid retention.   Weight - 286--> 275--> 269 lbs

## 2024-09-26 NOTE — PHYSICAL EXAM
[Restricted in physically strenuous activity but ambulatory and able to carry out work of a light or sedentary nature] : Status 1- Restricted in physically strenuous activity but ambulatory and able to carry out work of a light or sedentary nature, e.g., light house work, office work [Obese] : obese [Normal] : affect appropriate [de-identified] : Patinet deferred examination  [de-identified] : acme-form on face and ext. - improved

## 2024-09-26 NOTE — PHYSICAL EXAM
[Restricted in physically strenuous activity but ambulatory and able to carry out work of a light or sedentary nature] : Status 1- Restricted in physically strenuous activity but ambulatory and able to carry out work of a light or sedentary nature, e.g., light house work, office work [Obese] : obese [Normal] : affect appropriate [de-identified] : Patinet deferred examination  [de-identified] : acme-form on face and ext. - improved

## 2024-09-26 NOTE — HISTORY OF PRESENT ILLNESS
[de-identified] : Mr. Jovi Martinez is 67 years old male with mediastinal lymphadenopathy here for consultation, referred by Dr. Liborio Garcia.  Accompanied by his daughter  Patient with hx of diabetes, HTN, hyperlipidemia, penile neoplasm ( invasive SCC s/p partial penectomy 3/2023, inguinal dissection at [facility], recent Pet/CT scan with mediastinal LN.   5/30/2023 lung screening Mediastinum/hilum: Mediastinal lymphadenopathy including right lower paratracheal 3.0 x 1.3 cm lymph node (series 4 image 22) and 2.9 x 1.4 cm lymph node (series 4 image 28). Substernal extension left thyroid lobe. Pulmonary nodules: Right upper lobe 0.3 cm anterior subpleural nodule (series 3 image 112). Left lower lobe 0.3 cm nodule (series 3 image 122). Right middle lobe 0.2 cm possibly calcified nodule (series 3 image 135). Lung Rads Category :  2S  7/19/2023 Pet/CT  1.  Hypermetabolic mediastinal/hilar, celiac and right inguinal lymphadenopathy concerning for malignant involvement. This includes a 1.7 cm Station 1R/low cervical lymph node, SUV max 9.6. No focal uptake above background in remnant penile tissue. 2.  There is an appearance of mildly asymmetric uptake of the right rectal/distal sigmoid wall which may be due to misregistration and is difficult to separate from diffuse physiologic and metformin related uptake, SUV max 10.5. Correlation with recent colonoscopy results is recommended.  3.  Thin subcutaneous fluid in the region of the right anteromedial thigh status post lymph node dissection with mildly increased uptake, SUV max 2.8, likely inflammatory. 4.  Small left pleural effusion.  8/3/2023 A. LYMPH NODE, STATION 10L, EBUS, FNA: Negative for malignant cells  - Polymorphic lymphoid cells and bronchial cells, see comment. B. LYMPH NODE, STATION 7, EBUS, FNA: Negative for malignant cells  - Polymorphic lymphoid cells and bronchial cells, see comment. C. LYMPH NODE, STATION 4R, EBUS, FNA: Atypia  - Few non-necrotizing granuloma in a background of atypical bronchial cells and polymorphic lymphoid cells D. BRONCHIAL WASHING: Atypia - Atypical bronchial cells in a background of macrophages and chronic inflammatory cells, see comment. E. BAL: Atypia - Atypical bronchial cells in a background of macrophages and chronic inflammatory cells, see comment.  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 S/p hospitalization for groin biopsy complications/infection 12/16-12/17/23 Being Seen by Wound care, going to clinic 1x per week, VNS every other day  Overall feeling well Will be seeing Dr Lal for f/u in 6 weeks  - cleared to start chemo from Uro standpoint  Denies any fevers, chills, reports having normal regular BMs and normal urination without pain   He is s/p excisional biopsy of the right inguinal node with Dr Perez (11/30/23) Path: Metastatic squamous cell carcinoma    [de-identified] : He is seen today for follow up and begin Cetuximab #4 (8/15/24) - present) Here with his daughter Jasmina over the phone  patient is tolerating this treatment better, energy improved and generalized rash improved with Triamcinolone prn.  he's still taking Furosemide 40 mg bid as per recommended by his cardiologist for fluid retention.   Weight - 286--> 275--> 269 lbs

## 2024-09-26 NOTE — ASSESSMENT
[Designated Health Care Proxy] : Designated Health Care Proxy [Name: ___] : Name: [unfilled] [Relationship: ___] : Relationship: [unfilled] [Full Code] : full code [FreeTextEntry1] : ## Metastatic squamous cell carcinoma PET/CT(7/19/23) hypermetabolic mediastinal, hilar, celiac and right inguinal lymphadenopathy. Most FDG avid lymph node: Right inguinal lymph node Likely mets from his penile ca vs less likely alternate primary such as lung given he is chronic smoker Status post bilateral inguinal node dissection with Dr. Ramirez on 6/1/2023-Path shows no lymph node identified. Fibroadipose tissue. Subsequently underwent EBUS, FNA which was negative for any malignant cells Chronic active smoker- has greatly cut down but still smokes here and there. s/p excisional biopsy of the right inguinal LN Path cw Squamous cell ca ALE PDL1 10-15% s/p C5D1 Carboplatin paclitaxel and keytruda (2/16/24 - 5/15/24).  5/2024 Scans showed POD with mild worsening mediastinal and bilateral hilar lymphadenopathy and recurrence of right inguinal lymphadenopathy Opted to visit [facility] 1 month before starting 2nd line treatment. [place] due [place] and subsequent admission at [facility] for CHF exacerbation (admits to stopping lasix before the episode)  He is here to begin Cetuximab #4 (8/15/24 - present) 9/10/2024 CT -  Increased right inguinal and right external iliac lymphadenopathy.  Stable mediastinal, bilateral hilar, and upper abdominal lymphadenopathy. -Scan reviewed, analyzed, and discussed with patient and daughter -Explained that this a new baseline scan since he had 2-3 months delayed treatment from his prior scans.  -To continue with Erbitux since he tolerates it better and repeat scan in 3 months or earlier prn.  -Labs are drawn in the office, reviewed, analyzed, and discussed proceed with treatment.   Plan: Cetuximab 500 mg/m2 every 2 weeks  Penile SCC Status post biopsy of plans (2/03): Invasive SCC s/p partial penectomy (4/21/23) - Path: pT3, G2, moderately differentiated SCC infiltrates corpus cavernosum  #Cardiomyopathy continue following up with cardiology  Patient and daughter (Lucila) had multiple questions which were answered to satisfaction  d/w Dr. Maria  Follow-up in 2 weeks for cetuximab #5 CBC, CMP, magnesium  COntact: Lucila (Daughter) [phone]. ntro [FreeTextEntry2] : Paramjit - [phone]

## 2024-09-26 NOTE — ASSESSMENT
[Designated Health Care Proxy] : Designated Health Care Proxy [Name: ___] : Name: [unfilled] [Relationship: ___] : Relationship: [unfilled] [Full Code] : full code [FreeTextEntry1] : ## Metastatic squamous cell carcinoma PET/CT(7/19/23) hypermetabolic mediastinal, hilar, celiac and right inguinal lymphadenopathy. Most FDG avid lymph node: Right inguinal lymph node Likely mets from his penile ca vs less likely alternate primary such as lung given he is chronic smoker Status post bilateral inguinal node dissection with Dr. Ramirez on 6/1/2023-Path shows no lymph node identified. Fibroadipose tissue. Subsequently underwent EBUS, FNA which was negative for any malignant cells Chronic active smoker- has greatly cut down but still smokes here and there. s/p excisional biopsy of the right inguinal LN Path cw Squamous cell ca ALE PDL1 10-15% s/p C5D1 Carboplatin paclitaxel and keytruda (2/16/24 - 5/15/24).  5/2024 Scans showed POD with mild worsening mediastinal and bilateral hilar lymphadenopathy and recurrence of right inguinal lymphadenopathy Opted to visit [facility] 1 month before starting 2nd line treatment. [place] due [place] and subsequent admission at [facility] for CHF exacerbation (admits to stopping lasix before the episode)  He is here to begin Cetuximab #4 (8/15/24 - present) 9/10/2024 CT -  Increased right inguinal and right external iliac lymphadenopathy.  Stable mediastinal, bilateral hilar, and upper abdominal lymphadenopathy. -Scan reviewed, analyzed, and discussed with patient and daughter -Explained that this a new baseline scan since he had 2-3 months delayed treatment from his prior scans.  -To continue with Erbitux since he tolerates it better and repeat scan in 3 months or earlier prn.  -Labs are drawn in the office, reviewed, analyzed, and discussed proceed with treatment.   Plan: Cetuximab 500 mg/m2 every 2 weeks  Penile SCC Status post biopsy of plans (2/03): Invasive SCC s/p partial penectomy (4/21/23) - Path: pT3, G2, moderately differentiated SCC infiltrates corpus cavernosum  #Cardiomyopathy continue following up with cardiology  Patient and daughter (Lucila) had multiple questions which were answered to satisfaction  d/w Dr. Maria  Follow-up in 2 weeks for cetuximab #5 CBC, CMP, magnesium  COntact: Lucila (Daughter) [phone]. ntro [FreeTextEntry2] : Paramjit - [phone]

## 2024-10-10 ENCOUNTER — RESULT REVIEW (OUTPATIENT)
Age: 69
End: 2024-10-10

## 2024-10-10 ENCOUNTER — APPOINTMENT (OUTPATIENT)
Dept: HEMATOLOGY ONCOLOGY | Facility: CLINIC | Age: 69
End: 2024-10-10
Payer: MEDICARE

## 2024-10-10 VITALS
SYSTOLIC BLOOD PRESSURE: 101 MMHG | BODY MASS INDEX: 41.68 KG/M2 | WEIGHT: 265.56 LBS | OXYGEN SATURATION: 96 % | DIASTOLIC BLOOD PRESSURE: 64 MMHG | TEMPERATURE: 96.9 F | RESPIRATION RATE: 18 BRPM | HEIGHT: 67 IN | HEART RATE: 107 BPM

## 2024-10-10 DIAGNOSIS — Z51.11 ENCOUNTER FOR ANTINEOPLASTIC CHEMOTHERAPY: ICD-10-CM

## 2024-10-10 DIAGNOSIS — C77.9 SECONDARY AND UNSPECIFIED MALIGNANT NEOPLASM OF LYMPH NODE, UNSPECIFIED: ICD-10-CM

## 2024-10-10 DIAGNOSIS — R21 RASH AND OTHER NONSPECIFIC SKIN ERUPTION: ICD-10-CM

## 2024-10-10 PROCEDURE — 99214 OFFICE O/P EST MOD 30 MIN: CPT

## 2024-10-10 RX ORDER — CLINDAMYCIN PHOSPHATE 10 MG/ML
1 LOTION TOPICAL TWICE DAILY
Qty: 1 | Refills: 2 | Status: ACTIVE | COMMUNITY
Start: 2024-10-10 | End: 1900-01-01

## 2024-10-24 ENCOUNTER — RESULT REVIEW (OUTPATIENT)
Age: 69
End: 2024-10-24

## 2024-10-24 ENCOUNTER — APPOINTMENT (OUTPATIENT)
Dept: HEMATOLOGY ONCOLOGY | Facility: CLINIC | Age: 69
End: 2024-10-24
Payer: MEDICARE

## 2024-10-24 VITALS
OXYGEN SATURATION: 94 % | HEART RATE: 92 BPM | DIASTOLIC BLOOD PRESSURE: 75 MMHG | HEIGHT: 62 IN | SYSTOLIC BLOOD PRESSURE: 116 MMHG | BODY MASS INDEX: 49.37 KG/M2 | WEIGHT: 268.25 LBS | TEMPERATURE: 97.2 F | RESPIRATION RATE: 18 BRPM

## 2024-10-24 DIAGNOSIS — Z51.11 ENCOUNTER FOR ANTINEOPLASTIC CHEMOTHERAPY: ICD-10-CM

## 2024-10-24 DIAGNOSIS — C77.9 SECONDARY AND UNSPECIFIED MALIGNANT NEOPLASM OF LYMPH NODE, UNSPECIFIED: ICD-10-CM

## 2024-10-24 PROCEDURE — 99214 OFFICE O/P EST MOD 30 MIN: CPT

## 2024-11-07 ENCOUNTER — RESULT REVIEW (OUTPATIENT)
Age: 69
End: 2024-11-07

## 2024-11-07 ENCOUNTER — APPOINTMENT (OUTPATIENT)
Dept: HEMATOLOGY ONCOLOGY | Facility: CLINIC | Age: 69
End: 2024-11-07

## 2024-11-07 VITALS
OXYGEN SATURATION: 95 % | HEART RATE: 89 BPM | WEIGHT: 267.13 LBS | SYSTOLIC BLOOD PRESSURE: 114 MMHG | TEMPERATURE: 97.4 F | RESPIRATION RATE: 16 BRPM | HEIGHT: 67 IN | BODY MASS INDEX: 41.93 KG/M2 | DIASTOLIC BLOOD PRESSURE: 70 MMHG

## 2024-11-15 ENCOUNTER — NON-APPOINTMENT (OUTPATIENT)
Age: 69
End: 2024-11-15

## 2024-11-21 ENCOUNTER — APPOINTMENT (OUTPATIENT)
Dept: HEMATOLOGY ONCOLOGY | Facility: CLINIC | Age: 69
End: 2024-11-21
Payer: MEDICARE

## 2024-11-21 ENCOUNTER — RESULT REVIEW (OUTPATIENT)
Age: 69
End: 2024-11-21

## 2024-11-21 VITALS
DIASTOLIC BLOOD PRESSURE: 70 MMHG | BODY MASS INDEX: 41.59 KG/M2 | SYSTOLIC BLOOD PRESSURE: 132 MMHG | TEMPERATURE: 96.9 F | HEIGHT: 67 IN | HEART RATE: 80 BPM | OXYGEN SATURATION: 95 % | WEIGHT: 265 LBS | RESPIRATION RATE: 16 BRPM

## 2024-11-21 DIAGNOSIS — R91.8 OTHER NONSPECIFIC ABNORMAL FINDING OF LUNG FIELD: ICD-10-CM

## 2024-11-21 DIAGNOSIS — C60.9 MALIGNANT NEOPLASM OF PENIS, UNSPECIFIED: ICD-10-CM

## 2024-11-21 DIAGNOSIS — R21 RASH AND OTHER NONSPECIFIC SKIN ERUPTION: ICD-10-CM

## 2024-11-21 DIAGNOSIS — C77.9 SECONDARY AND UNSPECIFIED MALIGNANT NEOPLASM OF LYMPH NODE, UNSPECIFIED: ICD-10-CM

## 2024-11-21 PROCEDURE — 99215 OFFICE O/P EST HI 40 MIN: CPT

## 2024-11-21 RX ORDER — DOXYCYCLINE HYCLATE 100 MG/1
100 TABLET ORAL
Qty: 60 | Refills: 0 | Status: ACTIVE | COMMUNITY
Start: 2024-11-21 | End: 1900-01-01

## 2024-11-29 ENCOUNTER — RESULT REVIEW (OUTPATIENT)
Age: 69
End: 2024-11-29

## 2024-12-05 ENCOUNTER — APPOINTMENT (OUTPATIENT)
Dept: HEMATOLOGY ONCOLOGY | Facility: CLINIC | Age: 69
End: 2024-12-05
Payer: MEDICARE

## 2024-12-05 ENCOUNTER — RESULT REVIEW (OUTPATIENT)
Age: 69
End: 2024-12-05

## 2024-12-05 VITALS
TEMPERATURE: 97.3 F | HEART RATE: 86 BPM | RESPIRATION RATE: 16 BRPM | HEIGHT: 67 IN | DIASTOLIC BLOOD PRESSURE: 64 MMHG | WEIGHT: 263.44 LBS | OXYGEN SATURATION: 99 % | BODY MASS INDEX: 41.35 KG/M2 | SYSTOLIC BLOOD PRESSURE: 124 MMHG

## 2024-12-05 DIAGNOSIS — R21 RASH AND OTHER NONSPECIFIC SKIN ERUPTION: ICD-10-CM

## 2024-12-05 DIAGNOSIS — C77.9 SECONDARY AND UNSPECIFIED MALIGNANT NEOPLASM OF LYMPH NODE, UNSPECIFIED: ICD-10-CM

## 2024-12-05 DIAGNOSIS — Z51.11 ENCOUNTER FOR ANTINEOPLASTIC CHEMOTHERAPY: ICD-10-CM

## 2024-12-05 PROCEDURE — 99215 OFFICE O/P EST HI 40 MIN: CPT

## 2024-12-06 ENCOUNTER — NON-APPOINTMENT (OUTPATIENT)
Age: 69
End: 2024-12-06

## 2024-12-19 ENCOUNTER — RESULT REVIEW (OUTPATIENT)
Age: 69
End: 2024-12-19

## 2024-12-19 ENCOUNTER — RX RENEWAL (OUTPATIENT)
Age: 69
End: 2024-12-19

## 2024-12-19 ENCOUNTER — APPOINTMENT (OUTPATIENT)
Dept: HEMATOLOGY ONCOLOGY | Facility: CLINIC | Age: 69
End: 2024-12-19

## 2024-12-19 VITALS
HEART RATE: 100 BPM | SYSTOLIC BLOOD PRESSURE: 110 MMHG | TEMPERATURE: 97.2 F | RESPIRATION RATE: 16 BRPM | OXYGEN SATURATION: 99 % | BODY MASS INDEX: 40.71 KG/M2 | DIASTOLIC BLOOD PRESSURE: 76 MMHG | WEIGHT: 259.38 LBS | HEIGHT: 67 IN

## 2025-01-02 ENCOUNTER — RESULT REVIEW (OUTPATIENT)
Age: 70
End: 2025-01-02

## 2025-01-02 ENCOUNTER — APPOINTMENT (OUTPATIENT)
Dept: HEMATOLOGY ONCOLOGY | Facility: CLINIC | Age: 70
End: 2025-01-02

## 2025-01-03 ENCOUNTER — NON-APPOINTMENT (OUTPATIENT)
Age: 70
End: 2025-01-03

## 2025-01-07 RX ORDER — HYDROXYZINE HYDROCHLORIDE 25 MG/1
25 TABLET ORAL
Qty: 30 | Refills: 0 | Status: ACTIVE | COMMUNITY
Start: 2025-01-07 | End: 1900-01-01

## 2025-01-16 ENCOUNTER — APPOINTMENT (OUTPATIENT)
Dept: HEMATOLOGY ONCOLOGY | Facility: CLINIC | Age: 70
End: 2025-01-16
Payer: MEDICARE

## 2025-01-16 ENCOUNTER — RESULT REVIEW (OUTPATIENT)
Age: 70
End: 2025-01-16

## 2025-01-16 VITALS
OXYGEN SATURATION: 95 % | WEIGHT: 263.31 LBS | RESPIRATION RATE: 16 BRPM | BODY MASS INDEX: 41.33 KG/M2 | HEIGHT: 67 IN | SYSTOLIC BLOOD PRESSURE: 120 MMHG | TEMPERATURE: 97.3 F | HEART RATE: 89 BPM | DIASTOLIC BLOOD PRESSURE: 70 MMHG

## 2025-01-16 DIAGNOSIS — Z51.11 ENCOUNTER FOR ANTINEOPLASTIC CHEMOTHERAPY: ICD-10-CM

## 2025-01-16 DIAGNOSIS — C60.9 MALIGNANT NEOPLASM OF PENIS, UNSPECIFIED: ICD-10-CM

## 2025-01-16 DIAGNOSIS — R21 RASH AND OTHER NONSPECIFIC SKIN ERUPTION: ICD-10-CM

## 2025-01-16 DIAGNOSIS — C77.9 SECONDARY AND UNSPECIFIED MALIGNANT NEOPLASM OF LYMPH NODE, UNSPECIFIED: ICD-10-CM

## 2025-01-16 PROCEDURE — 99214 OFFICE O/P EST MOD 30 MIN: CPT | Mod: 25

## 2025-01-22 ENCOUNTER — RX RENEWAL (OUTPATIENT)
Age: 70
End: 2025-01-22

## 2025-01-30 ENCOUNTER — RESULT REVIEW (OUTPATIENT)
Age: 70
End: 2025-01-30

## 2025-02-07 ENCOUNTER — RX RENEWAL (OUTPATIENT)
Age: 70
End: 2025-02-07

## 2025-02-13 ENCOUNTER — RESULT REVIEW (OUTPATIENT)
Age: 70
End: 2025-02-13

## 2025-02-13 ENCOUNTER — APPOINTMENT (OUTPATIENT)
Dept: HEMATOLOGY ONCOLOGY | Facility: CLINIC | Age: 70
End: 2025-02-13
Payer: MEDICARE

## 2025-02-13 VITALS
SYSTOLIC BLOOD PRESSURE: 111 MMHG | BODY MASS INDEX: 41.75 KG/M2 | DIASTOLIC BLOOD PRESSURE: 70 MMHG | HEART RATE: 98 BPM | OXYGEN SATURATION: 94 % | HEIGHT: 67 IN | RESPIRATION RATE: 16 BRPM | TEMPERATURE: 97 F | WEIGHT: 266 LBS

## 2025-02-13 DIAGNOSIS — C77.9 SECONDARY AND UNSPECIFIED MALIGNANT NEOPLASM OF LYMPH NODE, UNSPECIFIED: ICD-10-CM

## 2025-02-13 DIAGNOSIS — L27.0 GENERALIZED SKIN ERUPTION DUE TO DRUGS AND MEDICAMENTS TAKEN INTERNALLY: ICD-10-CM

## 2025-02-13 DIAGNOSIS — Z51.11 ENCOUNTER FOR ANTINEOPLASTIC CHEMOTHERAPY: ICD-10-CM

## 2025-02-13 PROCEDURE — 99214 OFFICE O/P EST MOD 30 MIN: CPT | Mod: 25

## 2025-02-27 ENCOUNTER — APPOINTMENT (OUTPATIENT)
Dept: HEMATOLOGY ONCOLOGY | Facility: CLINIC | Age: 70
End: 2025-02-27

## 2025-02-27 ENCOUNTER — RX RENEWAL (OUTPATIENT)
Age: 70
End: 2025-02-27

## 2025-03-03 ENCOUNTER — RESULT REVIEW (OUTPATIENT)
Age: 70
End: 2025-03-03

## 2025-03-28 ENCOUNTER — RX RENEWAL (OUTPATIENT)
Age: 70
End: 2025-03-28

## 2025-04-29 ENCOUNTER — RX RENEWAL (OUTPATIENT)
Age: 70
End: 2025-04-29

## 2025-05-29 ENCOUNTER — RX RENEWAL (OUTPATIENT)
Age: 70
End: 2025-05-29

## 2025-07-24 ENCOUNTER — NON-APPOINTMENT (OUTPATIENT)
Age: 70
End: 2025-07-24

## (undated) DEVICE — SUPP ATHLETIC MALE XLG 44-55IN

## (undated) DEVICE — SUT VICRYL 3-0 27" SH UNDYED

## (undated) DEVICE — WARMING BLANKET UPPER ADULT

## (undated) DEVICE — VESSEL LOOP MAXI-BLUE 0.120" X 16"

## (undated) DEVICE — SUT SILK 4-0 18" TIES

## (undated) DEVICE — SYR CONTROL LUER LOK 10CC

## (undated) DEVICE — SUT SILK 2-0 30" PSL

## (undated) DEVICE — LIGASURE EXACT DISSECTOR

## (undated) DEVICE — SYR ASEPTO

## (undated) DEVICE — DRSG DERMABOND 0.7ML

## (undated) DEVICE — SUT VICRYL 4-0 27" RB-1 UNDYED

## (undated) DEVICE — PACK GENERAL MINOR

## (undated) DEVICE — ELCTR BIPOLAR CORD 12FT

## (undated) DEVICE — NDL HYPO REGULAR BEVEL 25G X 1.5" (BLUE)

## (undated) DEVICE — VENODYNE/SCD SLEEVE CALF MEDIUM

## (undated) DEVICE — URETERAL CATH RED RUBBER 14FR (GREEN)

## (undated) DEVICE — SUT MONOCRYL 4-0 27" PS-2 UNDYED

## (undated) DEVICE — GLV 7.5 PROTEXIS (WHITE)

## (undated) DEVICE — DRAIN JACKSON PRATT 7MM FLAT FULL NO TROCAR

## (undated) DEVICE — DRAIN RESERVOIR FOR JACKSON PRATT 100CC CARDINAL

## (undated) DEVICE — DRSG TELFA 3 X 8

## (undated) DEVICE — SYR LUER LOK 3CC

## (undated) DEVICE — LAP PAD 18 X 18"